# Patient Record
Sex: MALE | Race: ASIAN | NOT HISPANIC OR LATINO | Employment: OTHER | URBAN - METROPOLITAN AREA
[De-identification: names, ages, dates, MRNs, and addresses within clinical notes are randomized per-mention and may not be internally consistent; named-entity substitution may affect disease eponyms.]

---

## 2024-02-02 ENCOUNTER — OFFICE VISIT (OUTPATIENT)
Dept: PODIATRY | Facility: CLINIC | Age: 34
End: 2024-02-02
Payer: COMMERCIAL

## 2024-02-02 VITALS
HEART RATE: 86 BPM | BODY MASS INDEX: 34.21 KG/M2 | SYSTOLIC BLOOD PRESSURE: 121 MMHG | DIASTOLIC BLOOD PRESSURE: 82 MMHG | HEIGHT: 69 IN | WEIGHT: 231 LBS

## 2024-02-02 DIAGNOSIS — M79.674 PAIN DUE TO ONYCHOMYCOSIS OF TOENAILS OF BOTH FEET: Primary | ICD-10-CM

## 2024-02-02 DIAGNOSIS — B35.1 PAIN DUE TO ONYCHOMYCOSIS OF TOENAILS OF BOTH FEET: Primary | ICD-10-CM

## 2024-02-02 DIAGNOSIS — M79.675 PAIN DUE TO ONYCHOMYCOSIS OF TOENAILS OF BOTH FEET: Primary | ICD-10-CM

## 2024-02-02 PROCEDURE — 99203 OFFICE O/P NEW LOW 30 MIN: CPT | Performed by: PODIATRIST

## 2024-02-02 RX ORDER — CICLOPIROX 80 MG/ML
SOLUTION TOPICAL DAILY
Qty: 6.6 ML | Refills: 1 | Status: SHIPPED | OUTPATIENT
Start: 2024-02-02 | End: 2024-02-13

## 2024-02-02 NOTE — PROGRESS NOTES
"PATIENT HISTORY:  Tim Sigala is a 33 year old male who presents to clinic as a self referral with a chief complaint of thick fungal toenails on both feet.  The patient is seen with their wife.  The patient relates the pain is primarily located around the toenails on both feet.  The patient denies any surrounding redness swelling or drainage.  Any previous notes and studies that pertain to the patient's condition were reviewed.    Pertinent medical, surgical and family history was reviewed in the Epic chart.    Past Medical History: History reviewed. No pertinent past medical history.    Medications:   Current Outpatient Medications:     ciclopirox (PENLAC) 8 % external solution, Apply topically daily Apply to affected nails daily.  Remove the residual build up weekly with nail polish remover or an [place] board., Disp: 6.6 mL, Rfl: 1     Allergies:  No Known Allergies    Vitals: /82   Pulse 86   Ht 1.753 m (5' 9\")   Wt 104.8 kg (231 lb)   BMI 34.11 kg/m    BMI= Body mass index is 34.11 kg/m .    LOWER EXTREMITY PHYSICAL EXAM    Dermatologic: Skin is intact to right and left lower extremity without significant lesions, rash or abrasion.   The nails appear to be hypertrophic, incurvated and discolored x 10.     Vascular: DP & PT pulses are intact & regular on the right and left.   CFT and skin temperature is normal to the right and left lower extremity.     Neurologic: Lower extremity sensation is intact to light touch.  No evidence of weakness in the right and left lower extremity.        Musculoskeletal: Patient is ambulatory without assistive device or brace.  No gross ankle deformity noted.  No foot or ankle joint effusion is noted.            ASSESSMENT / PLAN:     ICD-10-CM    1. Pain due to onychomycosis of toenails of both feet  B35.1 ciclopirox (PENLAC) 8 % external solution    M79.675     M79.674           I have explained to Tim about the conditions.  We discussed the underlying contributing " factors to the condition as well as both conservative and surgical treatment options along with expected length of recovery.  At this time, the patient was prescribed Penlac 8% topical antifungal solution to be applied to the affected toenails for 6 to 9 months.  The patient may return to the office for reevaluation of problems persist.    Tim verbalized agreement with and understanding of the rational for the diagnosis and treatment plan.  All questions were answered to best of my ability and the patient's satisfaction. The patient was advised to contact the clinic with any questions that may arise after the clinic visit.      Disclaimer: This note consists of symbols derived from keyboarding, dictation and/or voice recognition software. As a result, there may be errors in the script that have gone undetected. Please consider this when interpreting information found in this chart.       MIKAEL Ewing D.P.M., [email or web address].S.

## 2024-02-02 NOTE — NURSING NOTE
"Chief Complaint   Patient presents with    Consult     Toenail fungus       Initial /82   Pulse 86   Ht 1.753 m (5' 9\")   Wt 104.8 kg (231 lb)   BMI 34.11 kg/m   Estimated body mass index is 34.11 kg/m  as calculated from the following:    Height as of this encounter: 1.753 m (5' 9\").    Weight as of this encounter: 104.8 kg (231 lb).  Medications and allergies reviewed.      Priscila VIRK MA    "

## 2024-02-02 NOTE — LETTER
"    2/2/2024         RE: Tim Sigala  [id number]  [facility] [address]        Dear Colleague,    Thank you for referring your patient, Tim Sigala, to the [facility] [place]. Please see a copy of my visit note below.    PATIENT HISTORY:  Tim Sigala is a 33 year old male who presents to clinic as a self referral with a chief complaint of thick fungal toenails on both feet.  The patient is seen with their wife.  The patient relates the pain is primarily located around the toenails on both feet.  The patient denies any surrounding redness swelling or drainage.  Any previous notes and studies that pertain to the patient's condition were reviewed.    Pertinent medical, surgical and family history was reviewed in the [facility] chart.    Past Medical History: History reviewed. No pertinent past medical history.    Medications:   Current Outpatient Medications:      ciclopirox (PENLAC) 8 % external solution, Apply topically daily Apply to affected nails daily.  Remove the residual build up weekly with nail polish remover or an [place] board., Disp: 6.6 mL, Rfl: 1     Allergies:  No Known Allergies    Vitals: /82   Pulse 86   Ht 1.753 m (5' 9\")   Wt 104.8 kg (231 lb)   BMI 34.11 kg/m    BMI= Body mass index is 34.11 kg/m .    LOWER EXTREMITY PHYSICAL EXAM    Dermatologic: Skin is intact to right and left lower extremity without significant lesions, rash or abrasion.   The nails appear to be hypertrophic, incurvated and discolored x 10.     Vascular: DP & PT pulses are intact & regular on the right and left.   CFT and skin temperature is normal to the right and left lower extremity.     Neurologic: Lower extremity sensation is intact to light touch.  No evidence of weakness in the right and left lower extremity.        Musculoskeletal: Patient is ambulatory without assistive device or brace.  No gross ankle deformity noted.  No foot or ankle joint effusion is noted.        "     ASSESSMENT / PLAN:     ICD-10-CM    1. Pain due to onychomycosis of toenails of both feet  B35.1 ciclopirox (PENLAC) 8 % external solution    M79.675     M79.674           I have explained to Tim about the conditions.  We discussed the underlying contributing factors to the condition as well as both conservative and surgical treatment options along with expected length of recovery.  At this time, the patient was prescribed Penlac 8% topical antifungal solution to be applied to the affected toenails for 6 to 9 months.  The patient may return to the office for reevaluation of problems persist.    Tim verbalized agreement with and understanding of the rational for the diagnosis and treatment plan.  All questions were answered to best of my ability and the patient's satisfaction. The patient was advised to contact the clinic with any questions that may arise after the clinic visit.      Disclaimer: This note consists of symbols derived from keyboarding, dictation and/or voice recognition software. As a result, there may be errors in the script that have gone undetected. Please consider this when interpreting information found in this chart.       MOOSE Orr.P.JEAN., F.A.C.F.A.S.      Again, thank you for allowing me to participate in the care of your patient.        Sincerely,        Galo Ewing DPM

## 2024-02-02 NOTE — PATIENT INSTRUCTIONS
NAIL FUNGUS / ONYCHOMYCOSIS   Nail fungus is not a hygiene problem and will likely not lead to significant medical problems. The nails may get thick causing pain and possibly local skin infection. Treatments include debridement (trimming), oral antifungals, topical antifungals and complete removal of the nail. Most fungal nails are not treated.     Topicals such as tea tree oil can be helpful for surface fungus and may, at best, limit progression. Over the counter creams (such as Lamisil) can also be used however, their effectiveness is also quite low.  Topical treatment with Pen lac is expensive and often not covered by insurance. Pen lac has an approximate 8% success rate. Topical therapy recommendations is to apply twice a day for at least 3-4 months as it takes 9 months for new nail to grow out.     Experts suggest soaking your feet for 15 to 20 minutes in a mixture of 1 cup vinegar to 4 cups warm water. Be sure to rinse well and pat your feet dry when you're done. You can soak your feet like this daily. But if your skin becomes irritated, try soaking only two to three times a week. Vicks VapoRub, as with vinegar, there have been no controlled clinical trials to assess the effectiveness of Vicks VapoRub on nail fungus, but there have been numerous anecdotal reports that it works. There's no consensus on how often to apply this product, so check with your doctor before using it on your nails.      Oral therapies include Sporanox and Lamisil. Oral therapies are also expensive and not very effective. Side effects such as liver disease are the main concern. Return of fungus is common even if the treatment worked.      Other Tips:  - Penlac nail medication apply daily x 4 months; remove old polish first day of each week  - Antifungal cream/powder (Zeasorb) - apply daily to feet and shoes x 2 months  - Clean shoes with Lysol or in washing machine every few weeks  - Rotate shoe gear; give them 24 hours to dry out  between days wearing them  - Clean pair of socks in morning, clean pair in afternoon if your feet sweat  - Shower shoes used in public showers/pools

## 2024-02-12 SDOH — HEALTH STABILITY: PHYSICAL HEALTH: ON AVERAGE, HOW MANY DAYS PER WEEK DO YOU ENGAGE IN MODERATE TO STRENUOUS EXERCISE (LIKE A BRISK WALK)?: 5 DAYS

## 2024-02-12 SDOH — HEALTH STABILITY: PHYSICAL HEALTH: ON AVERAGE, HOW MANY MINUTES DO YOU ENGAGE IN EXERCISE AT THIS LEVEL?: 60 MIN

## 2024-02-12 ASSESSMENT — ASTHMA QUESTIONNAIRES
QUESTION_5 LAST FOUR WEEKS HOW WOULD YOU RATE YOUR ASTHMA CONTROL: COMPLETELY CONTROLLED
QUESTION_4 LAST FOUR WEEKS HOW OFTEN HAVE YOU USED YOUR RESCUE INHALER OR NEBULIZER MEDICATION (SUCH AS ALBUTEROL): NOT AT ALL
ACT_TOTALSCORE: 24
ACT_TOTALSCORE: 24
QUESTION_2 LAST FOUR WEEKS HOW OFTEN HAVE YOU HAD SHORTNESS OF BREATH: ONCE OR TWICE A WEEK
QUESTION_1 LAST FOUR WEEKS HOW MUCH OF THE TIME DID YOUR ASTHMA KEEP YOU FROM GETTING AS MUCH DONE AT WORK, SCHOOL OR AT HOME: NONE OF THE TIME
QUESTION_3 LAST FOUR WEEKS HOW OFTEN DID YOUR ASTHMA SYMPTOMS (WHEEZING, COUGHING, SHORTNESS OF BREATH, CHEST TIGHTNESS OR PAIN) WAKE YOU UP AT NIGHT OR EARLIER THAN USUAL IN THE MORNING: NOT AT ALL

## 2024-02-12 ASSESSMENT — SOCIAL DETERMINANTS OF HEALTH (SDOH): HOW OFTEN DO YOU GET TOGETHER WITH FRIENDS OR RELATIVES?: MORE THAN THREE TIMES A WEEK

## 2024-02-13 ENCOUNTER — OFFICE VISIT (OUTPATIENT)
Dept: FAMILY MEDICINE | Facility: CLINIC | Age: 34
End: 2024-02-13
Payer: COMMERCIAL

## 2024-02-13 VITALS
OXYGEN SATURATION: 99 % | HEIGHT: 69 IN | HEART RATE: 78 BPM | WEIGHT: 238.8 LBS | SYSTOLIC BLOOD PRESSURE: 122 MMHG | BODY MASS INDEX: 35.37 KG/M2 | DIASTOLIC BLOOD PRESSURE: 80 MMHG | TEMPERATURE: 97.5 F | RESPIRATION RATE: 18 BRPM

## 2024-02-13 DIAGNOSIS — J45.20 MILD INTERMITTENT ASTHMA WITHOUT COMPLICATION: ICD-10-CM

## 2024-02-13 DIAGNOSIS — E66.3 OVERWEIGHT: ICD-10-CM

## 2024-02-13 DIAGNOSIS — R53.83 OTHER FATIGUE: ICD-10-CM

## 2024-02-13 DIAGNOSIS — M72.2 PLANTAR FASCIITIS: ICD-10-CM

## 2024-02-13 DIAGNOSIS — Z13.29 SCREENING FOR THYROID DISORDER: ICD-10-CM

## 2024-02-13 DIAGNOSIS — Z13.1 SCREENING FOR DIABETES MELLITUS: ICD-10-CM

## 2024-02-13 DIAGNOSIS — Z00.00 ROUTINE GENERAL MEDICAL EXAMINATION AT A HEALTH CARE FACILITY: Primary | ICD-10-CM

## 2024-02-13 DIAGNOSIS — Z13.220 LIPID SCREENING: ICD-10-CM

## 2024-02-13 LAB
ERYTHROCYTE [DISTWIDTH] IN BLOOD BY AUTOMATED COUNT: 12.4 % (ref 10–15)
HCT VFR BLD AUTO: 46.3 % (ref 40–53)
HGB BLD-MCNC: 15.2 G/DL (ref 13.3–17.7)
MCH RBC QN AUTO: 28.1 PG (ref 26.5–33)
MCHC RBC AUTO-ENTMCNC: 32.8 G/DL (ref 31.5–36.5)
MCV RBC AUTO: 86 FL (ref 78–100)
PLATELET # BLD AUTO: 252 10E3/UL (ref 150–450)
RBC # BLD AUTO: 5.4 10E6/UL (ref 4.4–5.9)
WBC # BLD AUTO: 6.9 10E3/UL (ref 4–11)

## 2024-02-13 PROCEDURE — 99385 PREV VISIT NEW AGE 18-39: CPT | Performed by: FAMILY MEDICINE

## 2024-02-13 PROCEDURE — 99214 OFFICE O/P EST MOD 30 MIN: CPT | Mod: 25 | Performed by: FAMILY MEDICINE

## 2024-02-13 PROCEDURE — 84270 ASSAY OF SEX HORMONE GLOBUL: CPT | Performed by: FAMILY MEDICINE

## 2024-02-13 PROCEDURE — 36415 COLL VENOUS BLD VENIPUNCTURE: CPT | Performed by: FAMILY MEDICINE

## 2024-02-13 PROCEDURE — 80061 LIPID PANEL: CPT | Performed by: FAMILY MEDICINE

## 2024-02-13 PROCEDURE — 85027 COMPLETE CBC AUTOMATED: CPT | Performed by: FAMILY MEDICINE

## 2024-02-13 PROCEDURE — 84443 ASSAY THYROID STIM HORMONE: CPT | Performed by: FAMILY MEDICINE

## 2024-02-13 PROCEDURE — 84403 ASSAY OF TOTAL TESTOSTERONE: CPT | Performed by: FAMILY MEDICINE

## 2024-02-13 PROCEDURE — 83036 HEMOGLOBIN GLYCOSYLATED A1C: CPT | Performed by: FAMILY MEDICINE

## 2024-02-13 PROCEDURE — 80053 COMPREHEN METABOLIC PANEL: CPT | Performed by: FAMILY MEDICINE

## 2024-02-13 RX ORDER — ALBUTEROL SULFATE 90 UG/1
2 AEROSOL, METERED RESPIRATORY (INHALATION) EVERY 6 HOURS PRN
Qty: 18 G | Refills: 3 | Status: SHIPPED | OUTPATIENT
Start: 2024-02-13

## 2024-02-13 ASSESSMENT — PAIN SCALES - GENERAL: PAINLEVEL: NO PAIN (0)

## 2024-02-13 ASSESSMENT — ENCOUNTER SYMPTOMS
CHILLS: 0
UNEXPECTED WEIGHT CHANGE: 0
SORE THROAT: 0
SHORTNESS OF BREATH: 0
DIFFICULTY URINATING: 0
ABDOMINAL PAIN: 0
APNEA: 0
EYE PAIN: 0
CONSTIPATION: 0
WEAKNESS: 0
EYE DISCHARGE: 0
AGITATION: 0
FATIGUE: 0
SINUS PAIN: 0
FEVER: 0
NERVOUS/ANXIOUS: 0
NUMBNESS: 0
PALPITATIONS: 0
NECK STIFFNESS: 0
NAUSEA: 0
SINUS PRESSURE: 0
EYE ITCHING: 0
COLOR CHANGE: 0
DYSURIA: 0
VOMITING: 0
FREQUENCY: 0
WHEEZING: 0
ACTIVITY CHANGE: 0
NECK PAIN: 0
BACK PAIN: 0
CONFUSION: 0
COUGH: 0
HEADACHES: 0
DIZZINESS: 0
PHOTOPHOBIA: 0
APPETITE CHANGE: 0
SLEEP DISTURBANCE: 0
DECREASED CONCENTRATION: 0
DIARRHEA: 0

## 2024-02-13 NOTE — PROGRESS NOTES
Preventive Care Visit  [facility] CHERI Baeza DO, Family Medicine  Feb 13, 2024      1. Routine general medical examination at a health care facility    2. Lipid screening  - Lipid panel reflex to direct LDL Fasting; Future  - Lipid panel reflex to direct LDL Fasting    3. Screening for diabetes mellitus  - Comprehensive metabolic panel (BMP + Alb, Alk Phos, ALT, AST, Total. Bili, TP); Future  - Comprehensive metabolic panel (BMP + Alb, Alk Phos, ALT, AST, Total. Bili, TP)    4. Mild intermittent asthma without complication  - albuterol (PROAIR HFA/PROVENTIL HFA/VENTOLIN HFA) 108 (90 Base) MCG/ACT inhaler; Inhale 2 puffs into the lungs every 6 hours as needed for shortness of breath, wheezing or cough  Dispense: 18 g; Refill: 3    5. Screening for thyroid disorder  - TSH with free T4 reflex; Future  - TSH with free T4 reflex    6. Other fatigue  - Testosterone Free and Total; Future  - CBC with platelets; Future  - Comprehensive metabolic panel (BMP + Alb, Alk Phos, ALT, AST, Total. Bili, TP); Future  - Testosterone Free and Total  - CBC with platelets  - Comprehensive metabolic panel (BMP + Alb, Alk Phos, ALT, AST, Total. Bili, TP)  - PRIMARY CARE FOLLOW-UP SCHEDULING; Future    7. Overweight  Stressed the importance of diet and exercise.  Close follow-up in 1 month.    8. Plantar fasciitis  Bilateral.  Encourage stretch exercises.       I spent 30 minutes with patient of which 50% was spent counseling and coordinating patient's care as well as discussing patient's plan of care.      Amparo Dumont is a 33 year old, presenting for the following:  Physical        2/13/2024     9:35 AM   Additional Questions   Roomed by Deidre   Accompanied by Sherley- wife        Health Care Directive  Patient does not have a Health Care Directive or Living Will: Discussed advance care planning with patient; however, patient declined at this time.    HPI  Patient is having acid reflux, and heel pain,  also is concerned about weight gain.         2/12/2024   General Health   How would you rate your overall physical health? (!) FAIR   Feel stress (tense, anxious, or unable to sleep) Only a little   (!) STRESS CONCERN      2/12/2024   Nutrition   Three or more servings of calcium each day? (!) NO   Diet: Regular (no restrictions)   How many servings of fruit and vegetables per day? (!) 2-3   How many sweetened beverages each day? 0-1         2/12/2024   Exercise   Days per week of moderate/strenous exercise 5 days   Average minutes spent exercising at this level 60 min         2/12/2024   Social Factors   Frequency of gathering with friends or relatives More than three times a week   Worry food won't last until get money to buy more No   Food not last or not have enough money for food? No   Do you have housing?  Yes   Are you worried about losing your housing? No   Lack of transportation? No   Unable to get utilities (heat,electricity)? No         2/12/2024   Dental   Dentist two times every year? Yes         2/12/2024   TB Screening   Were you born outside of [place]?  No           Today's PHQ-2 Score:       2/2/2024     8:07 AM   PHQ-2 ( 1999 [employer])   Q1: Little interest or pleasure in doing things 0   Q2: Feeling down, depressed or hopeless 0   PHQ-2 Score 0         2/12/2024   Substance Use   Alcohol more than 3/day or more than 7/wk Not Applicable   Do you use any other substances recreationally? No     Social History     Tobacco Use    Smoking status: Never     Passive exposure: Never    Smokeless tobacco: Never   Vaping Use    Vaping Use: Never used   Substance Use Topics    Alcohol use: Not Currently    Drug use: Never             2/12/2024   One time HIV Screening   Previous HIV test? I don't know         2/12/2024   STI Screening   New sexual partner(s) since last STI/HIV test? (!) YES          2/12/2024   Contraception/Family Planning   Questions about contraception or family planning No        Reviewed and  updated as needed this visit by Provider                    Past Medical History:   Diagnosis Date    Uncomplicated asthma 1994     Past Surgical History:   Procedure Laterality Date    HC REMOVAL OF TONSILS,<11 Y/O      Description: Tonsillectomy;  Recorded: 02/12/2013;     Establish care and physical exam    2. Weight concerns: 7-8 years ago, 247.  With regular exercise and diet, patient was able 180 lbs.  Had a  and worked out.  In the past 2 years, weight has slowly crept up.  Over the past 6 months, gained weight. Has been working out and barely ate.  Nothing seems to work.  Last two months, eats oatmeal.     3. GERD: Gets worse with spicy foods and eating.  Limiting eating.  Took omeprazole with good relief for the past 3 months.  Not able to eat well.    4. Bilateral heel discomfort: Gets better as day progresses.     Review of Systems   Constitutional:  Negative for activity change, appetite change, chills, fatigue, fever and unexpected weight change.   HENT:  Negative for congestion, ear pain, sinus pressure, sinus pain and sore throat.    Eyes:  Negative for photophobia, pain, discharge, itching and visual disturbance.   Respiratory:  Negative for apnea, cough, shortness of breath and wheezing.    Cardiovascular:  Negative for chest pain, palpitations and leg swelling.   Gastrointestinal:  Negative for abdominal pain, constipation, diarrhea, nausea and vomiting.   Genitourinary:  Negative for difficulty urinating, dysuria, frequency and urgency.   Musculoskeletal:  Negative for back pain, neck pain and neck stiffness.   Skin:  Negative for color change and rash.   Neurological:  Negative for dizziness, syncope, weakness, numbness and headaches.   Psychiatric/Behavioral:  Negative for agitation, behavioral problems, confusion, decreased concentration and sleep disturbance. The patient is not nervous/anxious.           Objective    Exam  /80   Pulse 78   Temp 97.5  F (36.4  C) (Temporal)    "Resp 18   Ht 1.746 m (5' 8.74\")   Wt 108.3 kg (238 lb 12.8 oz)   SpO2 99%   BMI 35.53 kg/m     Estimated body mass index is 35.53 kg/m  as calculated from the following:    Height as of this encounter: 1.746 m (5' 8.74\").    Weight as of this encounter: 108.3 kg (238 lb 12.8 oz).    Physical Exam  Constitutional:       General: He is not in acute distress.  HENT:      Head: Normocephalic and atraumatic.      Right Ear: External ear normal.      Left Ear: External ear normal.      Nose: Nose normal.      Mouth/Throat:      Pharynx: No oropharyngeal exudate.   Eyes:      General:         Right eye: No discharge.         Left eye: No discharge.      Conjunctiva/sclera: Conjunctivae normal.      Pupils: Pupils are equal, round, and reactive to light.   Neck:      Thyroid: No thyromegaly.      Trachea: No tracheal deviation.   Cardiovascular:      Rate and Rhythm: Normal rate and regular rhythm.      Heart sounds: Normal heart sounds. No murmur heard.  Pulmonary:      Effort: No respiratory distress.      Breath sounds: Normal breath sounds. No wheezing.   Chest:      Chest wall: No tenderness.   Abdominal:      General: There is no distension.      Palpations: Abdomen is soft. There is no mass.      Tenderness: There is no abdominal tenderness. There is no guarding.   Musculoskeletal:         General: Normal range of motion.      Cervical back: Normal range of motion and neck supple.      Comments: Bilateral heal tenderness   Lymphadenopathy:      Cervical: No cervical adenopathy.   Skin:     General: Skin is warm.      Findings: No erythema or rash.   Neurological:      Mental Status: He is alert and oriented to person, place, and time.      Cranial Nerves: No cranial nerve deficit.      Coordination: Coordination normal.         Signed Electronically by: Wilfrido Baeza DO    "

## 2024-02-14 DIAGNOSIS — E78.5 HYPERLIPIDEMIA LDL GOAL <160: Primary | ICD-10-CM

## 2024-02-14 LAB
ALBUMIN SERPL BCG-MCNC: 4.4 G/DL (ref 3.5–5.2)
ALP SERPL-CCNC: 59 U/L (ref 40–150)
ALT SERPL W P-5'-P-CCNC: 52 U/L (ref 0–70)
ANION GAP SERPL CALCULATED.3IONS-SCNC: 11 MMOL/L (ref 7–15)
AST SERPL W P-5'-P-CCNC: 30 U/L (ref 0–45)
BILIRUB SERPL-MCNC: 0.3 MG/DL
BUN SERPL-MCNC: 13.7 MG/DL (ref 6–20)
CALCIUM SERPL-MCNC: 9.5 MG/DL (ref 8.6–10)
CHLORIDE SERPL-SCNC: 105 MMOL/L (ref 98–107)
CHOLEST SERPL-MCNC: 245 MG/DL
CREAT SERPL-MCNC: 0.97 MG/DL (ref 0.67–1.17)
DEPRECATED HCO3 PLAS-SCNC: 25 MMOL/L (ref 22–29)
EGFRCR SERPLBLD CKD-EPI 2021: >90 ML/MIN/1.73M2
FASTING STATUS PATIENT QL REPORTED: ABNORMAL
GLUCOSE SERPL-MCNC: 95 MG/DL (ref 70–99)
HDLC SERPL-MCNC: 34 MG/DL
LDLC SERPL CALC-MCNC: 191 MG/DL
NONHDLC SERPL-MCNC: 211 MG/DL
POTASSIUM SERPL-SCNC: 4.7 MMOL/L (ref 3.4–5.3)
PROT SERPL-MCNC: 7.6 G/DL (ref 6.4–8.3)
SHBG SERPL-SCNC: 23 NMOL/L (ref 11–80)
SODIUM SERPL-SCNC: 141 MMOL/L (ref 135–145)
TRIGL SERPL-MCNC: 102 MG/DL
TSH SERPL DL<=0.005 MIU/L-ACNC: 2.63 UIU/ML (ref 0.3–4.2)

## 2024-02-14 RX ORDER — SIMVASTATIN 40 MG
40 TABLET ORAL AT BEDTIME
Qty: 90 TABLET | Refills: 1 | Status: SHIPPED | OUTPATIENT
Start: 2024-02-14 | End: 2024-07-16

## 2024-02-15 DIAGNOSIS — Z13.1 SCREENING FOR DIABETES MELLITUS: Primary | ICD-10-CM

## 2024-02-15 LAB
HBA1C MFR BLD: 5.6 % (ref 0–5.6)
TESTOST FREE SERPL-MCNC: 6.44 NG/DL
TESTOST SERPL-MCNC: 272 NG/DL (ref 240–950)

## 2024-03-21 ENCOUNTER — OFFICE VISIT (OUTPATIENT)
Dept: FAMILY MEDICINE | Facility: CLINIC | Age: 34
End: 2024-03-21
Attending: FAMILY MEDICINE
Payer: COMMERCIAL

## 2024-03-21 ENCOUNTER — TELEPHONE (OUTPATIENT)
Dept: FAMILY MEDICINE | Facility: CLINIC | Age: 34
End: 2024-03-21

## 2024-03-21 VITALS
TEMPERATURE: 97 F | DIASTOLIC BLOOD PRESSURE: 70 MMHG | WEIGHT: 236.8 LBS | RESPIRATION RATE: 18 BRPM | HEART RATE: 86 BPM | HEIGHT: 69 IN | OXYGEN SATURATION: 97 % | BODY MASS INDEX: 35.07 KG/M2 | SYSTOLIC BLOOD PRESSURE: 120 MMHG

## 2024-03-21 DIAGNOSIS — K21.00 GASTROESOPHAGEAL REFLUX DISEASE WITH ESOPHAGITIS WITHOUT HEMORRHAGE: ICD-10-CM

## 2024-03-21 DIAGNOSIS — E66.01 MORBID OBESITY (H): Primary | ICD-10-CM

## 2024-03-21 DIAGNOSIS — E78.5 HYPERLIPIDEMIA LDL GOAL <160: ICD-10-CM

## 2024-03-21 PROCEDURE — 99214 OFFICE O/P EST MOD 30 MIN: CPT | Performed by: FAMILY MEDICINE

## 2024-03-21 RX ORDER — OMEPRAZOLE 40 MG/1
40 CAPSULE, DELAYED RELEASE ORAL DAILY
Qty: 90 CAPSULE | Refills: 1 | Status: SHIPPED | OUTPATIENT
Start: 2024-03-21 | End: 2024-09-08

## 2024-03-21 ASSESSMENT — ENCOUNTER SYMPTOMS
NERVOUS/ANXIOUS: 0
WHEEZING: 0
COLOR CHANGE: 0
APPETITE CHANGE: 0
ALLERGIC/IMMUNOLOGIC NEGATIVE: 1
HALLUCINATIONS: 0
GASTROINTESTINAL NEGATIVE: 1
HEMATOLOGIC/LYMPHATIC NEGATIVE: 1
ACTIVITY CHANGE: 0
FEVER: 0
DIZZINESS: 0
DECREASED CONCENTRATION: 0
EYE PAIN: 0
ENDOCRINE NEGATIVE: 1
EYE DISCHARGE: 0
SEIZURES: 0
AGITATION: 0
COUGH: 0
FATIGUE: 0
HEADACHES: 0
CONFUSION: 0
SHORTNESS OF BREATH: 0
PALPITATIONS: 0

## 2024-03-21 ASSESSMENT — PAIN SCALES - GENERAL: PAINLEVEL: NO PAIN (0)

## 2024-03-21 NOTE — TELEPHONE ENCOUNTER
Prior Authorization Retail Medication Request    Medication/Dose: Semaglutide-Weight Management (WEGOVY) 0.25 MG/0.5ML pen  Diagnosis and ICD code (if different than what is on RX):  E66.01   New/renewal/insurance change PA/secondary ins. PA:  Previously Tried and Failed:  na  Rationale:  na    Insurance   Primary:  MEDICA  Insurance ID:  [id number]     Secondary (if applicable):na  Insurance ID:  na    Pharmacy Information (if different than what is on RX)  [facility]  Phone:  [phone]  Fax:     [phone]

## 2024-03-21 NOTE — PROGRESS NOTES
1. Morbid obesity (H)  Stressed the importance of diet and exercise.  Advised this is most likely attributing to patient's hyperlipidemia.   - Semaglutide-Weight Management (WEGOVY) 0.25 MG/0.5ML pen; Inject 0.25 mg Subcutaneous once a week  Dispense: 2 mL; Refill: 0    2. Gastroesophageal reflux disease with esophagitis without hemorrhage  Please avoid foods or drinks which contain citrus (tomato, pineapple, lime, lemon, etc), caffeine, chocolate, and spicy foods.  Avoid eating late at night.   - omeprazole (PRILOSEC) 40 MG DR capsule; Take 1 capsule (40 mg) by mouth daily  Dispense: 90 capsule; Refill: 1    3. Hyperlipidemia LDL goal <160  Advised to continue with simvastatin.     I spent 30 minutes with patient of which 50% was spent counseling and coordinating patient's care as well as discussing patient's plan of care.      Amparo Dumont is a 34 year old, presenting for the following health issues:  Hyperlipidemia, Weight Problem, and Gastrophageal Reflux      3/21/2024     9:01 AM   Additional Questions   Roomed by Deidre   Accompanied by Wife- Sherley     History of Present Illness       Reason for visit:  Follow up from last appt    He eats 2-3 servings of fruits and vegetables daily.He consumes 0 sweetened beverage(s) daily.He exercises with enough effort to increase his heart rate 30 to 60 minutes per day.  He exercises with enough effort to increase his heart rate 6 days per week.   He is taking medications regularly.         Hyperlipidemia Follow-Up    Are you regularly taking any medication or supplement to lower your cholesterol?   Yes- simvastatin   Are you having muscle aches or other side effects that you think could be caused by your cholesterol lowering medication?  No    Patient cami pt follow up on weight, and GERD is still having GERD symptoms.   How many servings of fruits and vegetables do you eat daily?  2-3  On average, how many sweetened beverages do you drink each day (Examples:  "soda, juice, sweet tea, etc.  Do NOT count diet or artificially sweetened beverages)?   0  How many days per week do you exercise enough to make your heart beat faster? 6  How many minutes a day do you exercise enough to make your heart beat faster? 30 - 60  How many days per week do you miss taking your medication? 0      Weight follow-up: Last two pounds since the last visit.  Patient has been diet and actually fasting with no marked improvement.  He also has been exercising as well.     2. Hyperlipidemia: Currently on simavastin.  Tolerating medication.    3. GERD: Chronic condition.  Tried omeprazole and diet modification.  States of heartburn symptoms at night time.     Review of Systems   Constitutional:  Negative for activity change, appetite change, fatigue and fever.   HENT: Negative.     Eyes:  Negative for pain, discharge and visual disturbance.   Respiratory:  Negative for cough, shortness of breath and wheezing.    Cardiovascular:  Negative for chest pain, palpitations and leg swelling.   Gastrointestinal: Negative.    Endocrine: Negative.    Genitourinary: Negative.    Skin:  Negative for color change and rash.   Allergic/Immunologic: Negative.    Neurological:  Negative for dizziness, seizures and headaches.   Hematological: Negative.    Psychiatric/Behavioral:  Negative for agitation, confusion, decreased concentration and hallucinations. The patient is not nervous/anxious.            Objective    /70   Pulse 86   Temp 97  F (36.1  C) (Temporal)   Resp 18   Ht 1.746 m (5' 8.74\")   Wt 107.4 kg (236 lb 12.8 oz)   SpO2 97%   BMI 35.23 kg/m    Body mass index is 35.23 kg/m .  Physical Exam  Constitutional:       General: He is not in acute distress.  HENT:      Head: Normocephalic and atraumatic.   Eyes:      Conjunctiva/sclera: Conjunctivae normal.   Cardiovascular:      Rate and Rhythm: Normal rate and regular rhythm.      Heart sounds: Normal heart sounds. No murmur heard.  Pulmonary:     "  Effort: Pulmonary effort is normal. No respiratory distress.      Breath sounds: No wheezing or rales.   Musculoskeletal:         General: Normal range of motion.   Skin:     Findings: No rash.   Neurological:      Mental Status: He is alert and oriented to person, place, and time.          Signed Electronically by: Wilfrido Baeza DO

## 2024-03-21 NOTE — PATIENT INSTRUCTIONS
Trae Dumont,    Thank you for allowing [facility] to manage your care.    I sent your prescriptions to your pharmacy.    For you weight, I am starting you on Wegovy 0.25 mg once a week.    Encourage 9071-7687 mg of calcium with vitamin D 1000 international units daily.     If you have any questions or concerns, please feel free to call us at [phone].    Sincerely,    Dr. Baeza    Did you know?      You can schedule a video visit for follow-up appointments as well as future appointments for certain conditions.  Please see the below link.     [email or web address]    If you have not already done so,  I encourage you to sign up for [facility] ([email or web address]).  This will allow you to review your results, securely communicate with a provider, and schedule virtual visits as well.

## 2024-03-22 ENCOUNTER — TELEPHONE (OUTPATIENT)
Dept: FAMILY MEDICINE | Facility: CLINIC | Age: 34
End: 2024-03-22

## 2024-03-22 NOTE — TELEPHONE ENCOUNTER
Prior Authorization Retail Medication Request    Medication/Dose: omeprazole (PRILOSEC) 40 MG DR capsule  Diagnosis and ICD code (if different than what is on RX):  K21.00   New/renewal/insurance change PA/secondary ins. PA:  Previously Tried and Failed:  na  Rationale:  na    Insurance   Primary:  MEDICA  Insurance ID:  [id number]     Secondary (if applicable):eliezer  Insurance ID:  eliezer    Pharmacy Information (if different than what is on RX)  Name:   [facility]  Phone:   [phone]  Fax:   [phone]

## 2024-03-26 NOTE — TELEPHONE ENCOUNTER
Pt transferred script to [facility] in [place] on [address]. PA still needed.     Jeanne Plascencia RN on 3/26/2024 at 1:45 PM

## 2024-03-26 NOTE — TELEPHONE ENCOUNTER
Pt transferred script to [facility] in [place] on [address]. PA still needed.     Jeanne Plascencia RN on 3/26/2024 at 1:45 PM

## 2024-04-03 NOTE — TELEPHONE ENCOUNTER
PRIOR AUTHORIZATION DENIED    Medication: WEGOVY 0.25 MG/0.5ML SC SOAJ    Insurance Company: [employer]-Specialty PA's - Phone [phone] Fax [phone]    Denial Date: 4/2/2024    Denial Reason(s): Excluded

## 2024-04-03 NOTE — TELEPHONE ENCOUNTER
PRIOR AUTHORIZATION DENIED    Medication: OMEPRAZOLE 40 MG PO CPDR    Insurance Company: [employer]-Specialty PA's - Phone [phone] Fax [phone]    Denial Date: 4/2/2024    Denial Reason(s): Excluded

## 2024-05-07 ENCOUNTER — OFFICE VISIT (OUTPATIENT)
Dept: FAMILY MEDICINE | Facility: CLINIC | Age: 34
End: 2024-05-07
Attending: FAMILY MEDICINE
Payer: COMMERCIAL

## 2024-05-07 VITALS
WEIGHT: 244 LBS | SYSTOLIC BLOOD PRESSURE: 118 MMHG | RESPIRATION RATE: 18 BRPM | TEMPERATURE: 97.2 F | HEART RATE: 72 BPM | OXYGEN SATURATION: 100 % | HEIGHT: 70 IN | BODY MASS INDEX: 34.93 KG/M2 | DIASTOLIC BLOOD PRESSURE: 68 MMHG

## 2024-05-07 DIAGNOSIS — E66.812 CLASS 2 SEVERE OBESITY WITH SERIOUS COMORBIDITY AND BODY MASS INDEX (BMI) OF 35.0 TO 35.9 IN ADULT, UNSPECIFIED OBESITY TYPE (H): Primary | ICD-10-CM

## 2024-05-07 DIAGNOSIS — E66.01 CLASS 2 SEVERE OBESITY WITH SERIOUS COMORBIDITY AND BODY MASS INDEX (BMI) OF 35.0 TO 35.9 IN ADULT, UNSPECIFIED OBESITY TYPE (H): Primary | ICD-10-CM

## 2024-05-07 PROCEDURE — 99213 OFFICE O/P EST LOW 20 MIN: CPT | Performed by: FAMILY MEDICINE

## 2024-05-07 ASSESSMENT — ENCOUNTER SYMPTOMS
AGITATION: 0
HALLUCINATIONS: 0
FATIGUE: 0
SEIZURES: 0
EYE PAIN: 0
HEMATOLOGIC/LYMPHATIC NEGATIVE: 1
SHORTNESS OF BREATH: 0
COLOR CHANGE: 0
COUGH: 0
WHEEZING: 0
ALLERGIC/IMMUNOLOGIC NEGATIVE: 1
PALPITATIONS: 0
ACTIVITY CHANGE: 0
HEADACHES: 0
DECREASED CONCENTRATION: 0
FEVER: 0
CONFUSION: 0
APPETITE CHANGE: 0
ENDOCRINE NEGATIVE: 1
NERVOUS/ANXIOUS: 0
EYE DISCHARGE: 0
DIZZINESS: 0
GASTROINTESTINAL NEGATIVE: 1

## 2024-05-07 ASSESSMENT — PAIN SCALES - GENERAL: PAINLEVEL: NO PAIN (0)

## 2024-05-07 NOTE — PROGRESS NOTES
1. Class 2 severe obesity with serious comorbidity and body mass index (BMI) of 35.0 to 35.9 in adult, unspecified obesity type (H)  Stressed the importance of diet and exercise.  If Zepbound is not covered, consider phentermine.  Advised patient to not get discouraged.  Close follow-up in 6-8 weeks.   - tirzepatide-Weight Management (ZEPBOUND) 2.5 MG/0.5ML prefilled pen; Inject 0.5 mLs (2.5 mg) Subcutaneous every 7 days  Dispense: 3 mL; Refill: 0  - Adult Comprehensive Weight Management  Referral; Future      Subjective   Tim is a 34 year old, presenting for the following health issues:  Weight Check      5/7/2024     9:58 AM   Additional Questions   Roomed by Deidre   Accompanied by Wife Sherley     History of Present Illness       Reason for visit:  Weight management    He eats 2-3 servings of fruits and vegetables daily.He consumes 0 sweetened beverage(s) daily.He exercises with enough effort to increase his heart rate 20 to 29 minutes per day.  He exercises with enough effort to increase his heart rate 4 days per week.   He is taking medications regularly.         Patient is not feeling good about his weight loss today he is up 8 LBS from last visit in March   How many servings of fruits and vegetables do you eat daily?  2-3  On average, how many sweetened beverages do you drink each day (Examples: soda, juice, sweet tea, etc.  Do NOT count diet or artificially sweetened beverages)?   0  How many days per week do you exercise enough to make your heart beat faster? 4  How many minutes a day do you exercise enough to make your heart beat faster? 20 - 29  How many days per week do you miss taking your medication? 0      Overweight follow-up: Patient continues to exercise and diet.  Wegovy was not covered.  Unfortunately, he has gained weight since the last visit.  He states that he is trying to watch his diet by eating healthy and portion sizes.  He and wife are active in exercising.  This has been a  "chronic condition that is negatively impacting his mental health.     2. GERD: States that omeprazole.  He is trying to watch his diet which helping his symptoms.       Review of Systems   Constitutional:  Negative for activity change, appetite change, fatigue and fever.   HENT: Negative.     Eyes:  Negative for pain, discharge and visual disturbance.   Respiratory:  Negative for cough, shortness of breath and wheezing.    Cardiovascular:  Negative for chest pain, palpitations and leg swelling.   Gastrointestinal: Negative.    Endocrine: Negative.    Genitourinary: Negative.    Skin:  Negative for color change and rash.   Allergic/Immunologic: Negative.    Neurological:  Negative for dizziness, seizures and headaches.   Hematological: Negative.    Psychiatric/Behavioral:  Negative for agitation, confusion, decreased concentration and hallucinations. The patient is not nervous/anxious.            Objective    /68   Pulse 72   Temp 97.2  F (36.2  C) (Temporal)   Resp 18   Ht 1.766 m (5' 9.53\")   Wt 110.7 kg (244 lb)   SpO2 100%   BMI 35.49 kg/m    Body mass index is 35.49 kg/m .  Physical Exam  Constitutional:       General: He is not in acute distress.     Appearance: He is well-developed.   HENT:      Head: Normocephalic and atraumatic.      Nose: Nose normal.   Eyes:      Conjunctiva/sclera: Conjunctivae normal.   Neck:      Trachea: No tracheal deviation.   Cardiovascular:      Rate and Rhythm: Normal rate and regular rhythm.      Heart sounds: Normal heart sounds.   Pulmonary:      Effort: Pulmonary effort is normal.      Breath sounds: No wheezing.   Musculoskeletal:         General: Normal range of motion.      Cervical back: Normal range of motion.   Skin:     Findings: No erythema or rash.   Neurological:      Mental Status: He is alert and oriented to person, place, and time.   Psychiatric:         Behavior: Behavior normal.                Signed Electronically by: Wilfrido Baeza DO    "

## 2024-05-07 NOTE — PATIENT INSTRUCTIONS
Trae Dumont,    Thank you for allowing [facility] to manage your care.    I sent your prescriptions to your pharmacy.    If you have any questions or concerns, please feel free to call us at [phone].    Sincerely,    Dr. Baeza    Did you know?      You can schedule a video visit for follow-up appointments as well as future appointments for certain conditions.  Please see the below link.     [email or web address]    If you have not already done so,  I encourage you to sign up for [employer] ([email or web address]).  This will allow you to review your results, securely communicate with a provider, and schedule virtual visits as well.

## 2024-05-08 ENCOUNTER — TELEPHONE (OUTPATIENT)
Dept: FAMILY MEDICINE | Facility: CLINIC | Age: 34
End: 2024-05-08

## 2024-05-08 NOTE — TELEPHONE ENCOUNTER
Prior Authorization Retail Medication Request    Medication/Dose: tirzepatide-Weight Management (ZEPBOUND) 2.5 MG/0.5ML prefilled pen  Diagnosis and ICD code (if different than what is on RX):    New/renewal/insurance change PA/secondary ins. PA:  Previously Tried and Failed:  na  Rationale:  na    Insurance   Primary: MEDICA  Insurance ID:  [id number]     Secondary (if applicable):na  Insurance ID:  na    Pharmacy Information (if different than what is on RX)  Name:  [facility]  Phone:  [phone]  Fax:      [phone]

## 2024-05-22 DIAGNOSIS — E78.5 HYPERLIPIDEMIA LDL GOAL <160: ICD-10-CM

## 2024-05-22 RX ORDER — SIMVASTATIN 40 MG
40 TABLET ORAL AT BEDTIME
Qty: 90 TABLET | Refills: 1 | OUTPATIENT
Start: 2024-05-22

## 2024-05-22 NOTE — TELEPHONE ENCOUNTER
Note: Due to record-high volumes, our turn-around time is taking longer than usual . We are currently 2 weeks behind in the pools.   We are working diligently to submit all requests in a timely manner and in the order they are received. Please only flag TRUE URGENT requests as high priority to the pool at this time.   If you have questions on status of PA's,  please send a note/message in the active PA encounter and send back to the [facility] PA pool [[id number]].    If you have questions about the turn-around time or about our process, please reach out to our supervisor Gladis Reyes.   Thank you!   RPPA (Retail Pharmacy Prior Authorization) team    We are currently submitting requests we received on 05/08/2024    Retail Pharmacy Prior Authorization Team   Phone: [phone]      PA Initiation    Medication: ZEPBOUND 2.5 MG/0.5ML SC SOAJ  Insurance Company: [employer] ([employer]) - Phone [phone] Fax [phone]  Pharmacy Filling the Rx: [facility] [address] [address]  Filling Pharmacy Phone: [phone]  Filling Pharmacy Fax:    Start Date: 5/22/2024    Started PA on CMM and a response of This medication may be excluded from the patient's benefit. For more information, please reach out to [employer] directly.  Per insurance, this medication is a plan exclusion and there is not an option to complete a PA.

## 2024-07-16 ENCOUNTER — TELEPHONE (OUTPATIENT)
Dept: FAMILY MEDICINE | Facility: CLINIC | Age: 34
End: 2024-07-16
Payer: COMMERCIAL

## 2024-07-16 ENCOUNTER — PATIENT OUTREACH (OUTPATIENT)
Dept: CARE COORDINATION | Facility: CLINIC | Age: 34
End: 2024-07-16
Payer: COMMERCIAL

## 2024-07-16 RX ORDER — PRASUGREL 10 MG/1
TABLET, FILM COATED ORAL DAILY
COMMUNITY

## 2024-07-16 RX ORDER — ATORVASTATIN CALCIUM 80 MG/1
80 TABLET, FILM COATED ORAL DAILY
COMMUNITY

## 2024-07-16 RX ORDER — NITROGLYCERIN 0.4 MG/1
0.4 TABLET SUBLINGUAL EVERY 5 MIN PRN
COMMUNITY

## 2024-07-16 RX ORDER — LOSARTAN POTASSIUM 25 MG/1
25 TABLET ORAL DAILY
COMMUNITY

## 2024-07-16 RX ORDER — METOPROLOL SUCCINATE 25 MG/1
25 TABLET, EXTENDED RELEASE ORAL DAILY
COMMUNITY

## 2024-07-16 RX ORDER — ASPIRIN 81 MG/1
81 TABLET, CHEWABLE ORAL DAILY
COMMUNITY

## 2024-07-16 NOTE — TELEPHONE ENCOUNTER
Attempted to call patient with number on file to relay provider's message below with no answer, left voicemail to call clinic back at [phone].     Jeanne Plascencia, RN on 7/16/2024 at 4:34 PM

## 2024-07-16 NOTE — PROGRESS NOTES
Clinical Product Navigator RN reviewed chart; patient on payer product coverage.  Review results:        Obtaining further information to determine needs and next steps. Patient identified by Health Plan as a potential candidate for Primary Care Coordination due to recent admission. Per chart review, Patient was admitted to [facility] 7/12 - 7/13/24 for CAD / NSTEMI.    S/p PCI dLAD and pD1 7/12/2024 , plan for staged intervention of the RCA, Marginal and LAD in 2 weeks     Ischemic cardiomyopathy ,ejection fraction 45-50% with inferior wall motion abnormalities.     RN Clinical Product Navigator called and spoke with Velasquezalfredosabiha. Tim states that he is feeling better every day. Denies any new concerns or complaints. Confirms that he has all of his medications and has been taking them as prescribed. Home Medication list updated.     Per AVS:     STOP taking these medicines       simvastatin 40 mg tablet  Commonly known as: ZOCOR        Started on these new Medications:      Instructions   aspirin chewable 81 mg chewable tablet  For diagnoses: Coronary artery disease involving native coronary artery of native heart, unspecified whether angina present  Start taking on: July 14, 2024    Chew 1 Tablet (81 mg) by mouth once daily.      atorvastatin 80 mg tablet  For diagnoses: Coronary artery disease involving native coronary artery of native heart, unspecified whether angina present  Commonly known as: LIPITOR    Take 1 Tablet (80 mg) by mouth once daily in the evening.      losartan 25 mg tablet  For diagnoses: NSTEMI (non-ST elevated myocardial infarction) (HC)  Commonly known as: COZAAR    Take 0.5 Tablets (12.5 mg) by mouth once daily.      metoprolol succinate 25 mg Sustained-Release tablet  For diagnoses: Coronary artery disease involving native coronary artery of native heart, unspecified whether angina present  Commonly known as: TOPROL XL    Take 1 Tablet (25 mg) by mouth two times daily.      nitroglycerin  0.4 mg sublingual tablet  For diagnoses: NSTEMI (non-ST elevated myocardial infarction) (HC)  Commonly known as: NITROSTAT    Place 1 Tablet (0.4 mg) under the tongue every 5 minutes if needed for Chest pain 1st choice (Hold if SBP less than 90 mmHg). Up to 3 tablets in 15 minutes.      prasugreL 10 mg Tab tablet  For diagnoses: NSTEMI (non-ST elevated myocardial infarction) (HC), Coronary artery disease involving native coronary artery of native heart, unspecified whether angina present  Commonly known as: EFFIENT      Patient states that he is awaiting prior authorization approval for Echo before he is able to schedule. Confirms he has the below Cardiology follow up appointments.     [facility]   7/24/24 at 1pm. Cardiology follow up with Dr. Licea. Cardiology RN plans to call patient weekly for updates and close monitoring.     Patient was previously scheduled for follow up with PCP regarding weight management for 8/6/24 at 1pm. Inquired if patient would like CPN to secure an earlier appointment for post hospital follow up with PCP. Patient declines offer and states that he feels well supported with Cardiology follow up next week.     Patient declines on going Primary Care Coordination needs at this time.    RN Clinical Product Navigator will send FYI to PCP.      Tanika Gardner RN  Clinical Product Navigator  Ph: [phone]

## 2024-07-16 NOTE — PROGRESS NOTES
Clinic Care Coordination Contact  Transitions of Care Outreach  Chief Complaint   Patient presents with    Clinic Care Coordination - Post Hospital       Patient was admitted to [facility] 7/12 - 7/13/24 for CAD / NSTEMI.              S/p PCI dLAD and pD1 7/12/2024 , plan for staged intervention of the RCA, Marginal and LAD in 2 weeks    Ischemic cardiomyopathy ,ejection fraction 45-50% with inferior wall motion abnormalities.     Hospital Course:     Tim Sigala is a(n) 34 y.o. with a history of obesity, hyperlipidemia, GERD, mild intermittent asthma, who was in his usual state of health up until 9:30 PM last night when he started having pain in the anterior chest that radiated up to neck and jaw.  Pain was dull 5/10 intensity at its highest.  EMS was called who gave 4 baby aspirin and sublingual nitroglycerin.  His chest pain improved following that but he had persistent jaw and neck pain.  He had some subjective shortness of breath with chest pain.  No fever, chills or cough.  No syncope.  No history of alcohol or tobacco use.  Both parents have hyperlipidemia.  History of heart disease in grandfather.      Transitions of Care Assessment    Discharge Assessment  How are you doing now that you are home?: RN Clinical Product Navigator called and spoke with Tim. Tim states that he is feeling better every day. Denies any new concerns or complaints. Confirms that he has all of his medications and has been taking them as prescribed. Home Medication list updated. Patient states that he is awaiting prior authorization approval for Echo before he is able to schedule. Confirms he has the below Cardiology follow up appointments.     [facility] [place] [facility]   7/24/24 at 1pm. Cardiology follow up with Dr. Licea. Cardiology RN plans to call patient weekly for updates and close monitoring.     Patient was previously scheduled for follow up with PCP regarding weight  management for 8/6/24 at 1pm. Inquired if patient would like CPN to secure an earlier appointment for post hospital follow up with PCP. Patient declines offer and states that he feels well supported with Cardiology follow up next week.     Patient declines on going Primary Care Coordination needs at this time.    RN Clinical Product Navigator will send FYI to PCP.  How are your symptoms? (Red Flag symptoms escalate to triage hotline per guidelines): Improved  Do you know how to contact your clinic care team if you have future questions or changes to your health status? : Yes  Does the patient have their discharge instructions? : Yes  Does the patient have questions regarding their discharge instructions? : No  Were you started on any new medications or were there changes to any of your previous medications? : Yes  Does the patient have all of their medications?: Yes  Do you have questions regarding any of your medications? : No  Do you have all of your needed medical supplies or equipment (DME)?  (i.e. oxygen tank, CPAP, cane, etc.): Yes              Care Management       Care Mgmt General Assessment  Referral  Referral Source: Health Plan                      Follow up Plan     Discharge Follow-Up  Discharge follow up appointment scheduled in alignment with recommended follow up timeframe or Transitions of Risk Category? (Low = within 30 days; Moderate= within 14 days; High= within 7 days): Yes  Discharge Follow Up Appointment Date: 07/24/24  Discharge Follow Up Appointment Scheduled with?: Specialty Care Provider    Future Appointments   Date Time Provider Department Center   8/6/2024  1:00 PM Wilfrido Baeza DO [facility]       Outpatient Plan as outlined on AVS reviewed with patient.    For any urgent concerns, please contact our 24 hour nurse triage line: [phone] ([phone])       Avani Gardner RN

## 2024-07-16 NOTE — TELEPHONE ENCOUNTER
Please schedule a hospital follow-up so I can determine any tests are needed.  I was able to review his hospital records and cardiology plans to schedule upcoming imaging studies.  May use next available GENIE or RN Triage slot.

## 2024-07-16 NOTE — TELEPHONE ENCOUNTER
Order/Referral Request    Who is requesting: insurance    Orders being requested: Echo/MRI    Reason service is needed/diagnosis: heart attack  on 07/11/2024    When are orders needed by: as soon as possible    Has this been discussed with Provider: No    Does patient have a preference on a Group/Provider/Facility?   [facility]    Does patient have an appointment scheduled?: No    Where to send orders:     Could we send this information to you in [facility] or would you prefer to receive a phone call?:   Patient would prefer a phone call   Okay to leave a detailed message?: Yes at Cell number on file:    Telephone Information:   Mobile [phone]

## 2024-07-18 ENCOUNTER — TELEPHONE (OUTPATIENT)
Dept: FAMILY MEDICINE | Facility: CLINIC | Age: 34
End: 2024-07-18

## 2024-07-18 ENCOUNTER — OFFICE VISIT (OUTPATIENT)
Dept: FAMILY MEDICINE | Facility: CLINIC | Age: 34
End: 2024-07-18
Payer: COMMERCIAL

## 2024-07-18 VITALS
DIASTOLIC BLOOD PRESSURE: 64 MMHG | RESPIRATION RATE: 18 BRPM | OXYGEN SATURATION: 99 % | HEIGHT: 70 IN | WEIGHT: 230.8 LBS | HEART RATE: 70 BPM | SYSTOLIC BLOOD PRESSURE: 112 MMHG | TEMPERATURE: 97.1 F | BODY MASS INDEX: 33.04 KG/M2

## 2024-07-18 DIAGNOSIS — I25.10 CORONARY ARTERY DISEASE INVOLVING NATIVE HEART WITHOUT ANGINA PECTORIS, UNSPECIFIED VESSEL OR LESION TYPE: ICD-10-CM

## 2024-07-18 DIAGNOSIS — I21.4 NSTEMI (NON-ST ELEVATED MYOCARDIAL INFARCTION) (H): Primary | ICD-10-CM

## 2024-07-18 DIAGNOSIS — E66.01 MORBID OBESITY (H): ICD-10-CM

## 2024-07-18 PROCEDURE — 99495 TRANSJ CARE MGMT MOD F2F 14D: CPT | Performed by: FAMILY MEDICINE

## 2024-07-18 ASSESSMENT — PAIN SCALES - GENERAL: PAINLEVEL: NO PAIN (0)

## 2024-07-18 NOTE — PROGRESS NOTES
1. NSTEMI (non-ST elevated myocardial infarction) (H)  S/P MARCE of Proximal 1st diagnoal and distal LAD on 7/12/24.  Continue with dual platelet treatment (aspirin and prasugrel), lipitor, losartan, and metoprolol.  Scheduled to go through cardiac rehab.  Appreciate cardiology follow-up.   - MRI Cardiac w/contrast; Future    2. Morbid obesity (H)  Given patient's history of NSTEMI and CAD, patient would benefit from weight loss.   - tirzepatide-Weight Management (ZEPBOUND) 7.5 MG/0.5ML prefilled pen; Inject 0.5 mLs (7.5 mg) subcutaneously every 7 days  Dispense: 3 mL; Refill: 0    I spent 30 minutes with patient of which 50% was spent counseling and coordinating patient's care as well as discussing patient's plan of care.      Amparo Dumont is a 34 year old, presenting for the following health issues:  Hospital F/U      7/18/2024     7:46 AM   Additional Questions   Roomed by Deidre   Accompanied by Wife Sherley     [facility]          Hospital Follow-up Visit:    Hospital/Nursing Home/IP Rehab Facility:  [facility]   Date of Admission: 7/12/24  Date of Discharge: 7/13/24  Reason(s) for Admission: NSTEMI (non-ST elevated myocardial infarction) (HC)  Active Problems:  NSVT (nonsustained ventricular tachycardia) (HC)  Hyperlipidemia  Obesity   Was the patient in the ICU or did the patient experience delirium during hospitalization?  No  Do you have any other stressors you would like to discuss with your provider? No    Problems taking medications regularly:  None  Medication changes since discharge: Yes multiple  Problems adhering to non-medication therapy:  None    Summary of hospitalization:  CareEverywhere information obtained and reviewed  Diagnostic Tests/Treatments reviewed.  Follow up needed: Cardiac MRI  Other Healthcare Providers Involved in Patient s Care:         Specialist appointment - Cardiology and Cardiac Rehab  Update since discharge: stable.         Plan of care communicated with patient    "          Hospital follow-up: Patient was out for desert.  Felt jaw pain in the evening.  Pain was moving down chest. Patient felt tired and radiated to back.  New symptom.  Burping a lot.  Patient called the ambulance.  Patient took four baby aspirin.  Initial EKG did not show any acute findings.  Patient was transferred to [facility] via ambulance.  Initial troponin were elevated.  Had multiple episodes of sustained with V-tach.  Angiogram shows four blockage.  Patient had MARCE diagonal and distal LAD.  Scheduled for echo MRI.  History of Kawasaki.  As today, states of slight chest discomfort.  No discomfort yesterday.  Patient is currently on prasugrel, metoprolol, losartan, and aspirin.     Non-ST elevation MI  Status post drug-eluting stent to first diagonal and distal LAD.  Staged intervention of RCA ,marginal ,LAD in 2 weeks.           Objective    /64   Pulse 70   Temp 97.1  F (36.2  C) (Temporal)   Resp 18   Ht 1.781 m (5' 10.12\")   Wt 104.7 kg (230 lb 12.8 oz)   SpO2 99%   BMI 33.00 kg/m    Body mass index is 33 kg/m .  Physical Exam  Constitutional:       General: He is not in acute distress.     Appearance: He is well-developed.   HENT:      Head: Normocephalic and atraumatic.      Nose: Nose normal.   Eyes:      Conjunctiva/sclera: Conjunctivae normal.   Neck:      Trachea: No tracheal deviation.   Cardiovascular:      Rate and Rhythm: Normal rate and regular rhythm.      Heart sounds: Normal heart sounds.   Pulmonary:      Effort: Pulmonary effort is normal.      Breath sounds: No wheezing.   Musculoskeletal:         General: Normal range of motion.      Cervical back: Normal range of motion.   Skin:     Findings: No erythema or rash.   Neurological:      Mental Status: He is alert and oriented to person, place, and time.   Psychiatric:         Behavior: Behavior normal.          DIAGNOSTIC - CORONARY   * The left main rises from the aorta in its normal position.   * 70% stenosis in the mid LAD "   * 85% stenosis in the distal LAD   * Acute total occlusion of the proximal 1st Diagonal   * 75% stenosis in the proximal 2nd Diagonal   * 95% stenosis in the distal Circumflex   * Chronic total occlusion (with bridging collaterals) of the distal RCA     INTERVENTION   * Successful angioplasty and stenting (drug eluting) of the proximal 1st diagonal   * Successful angioplasty and stenting (drug eluting) of the distal LAD     Signed Electronically by: Wilfrido Baeza DO

## 2024-07-18 NOTE — TELEPHONE ENCOUNTER
Prior Authorization Retail Medication Request    Medication/Dose: tirzepatide-Weight Management (ZEPBOUND) 7.5 MG/0.5ML prefilled pen    Diagnosis and ICD code (if different than what is on RX):    Morbid obesity (H) [E66.01]     New/renewal/insurance change PA/secondary ins. PA:  Previously Tried and Failed:    Rationale:      Insurance   Primary: MEDICA IFB BOLD   Insurance ID:  [id number]     Secondary (if applicable):  Insurance ID:      Pharmacy Information (if different than what is on RX)  Name:    Phone:    Fax:

## 2024-07-18 NOTE — PATIENT INSTRUCTIONS
Trae Dumont,    Thank you for allowing [facility] to manage your care.    I sent your prescriptions to your pharmacy.    I ordered an cardiac MRI, please call diagnostic imaging [phone] to schedule your test.    For your convenience, test results are released as soon as they are available  Please allow 1-2 business days for me to send you a comment about your results.  If not done so, I encourage you to login into [employer] ([email or web address] to review your results in real time.     If you have any questions or concerns, please feel free to call us at [phone].    Sincerely,    Dr. Baeza    Did you know?      You can schedule a video visit for follow-up appointments as well as future appointments for certain conditions.  Please see the below link.     [email or web address]    If you have not already done so,  I encourage you to sign up for [employer] ([email or web address]).  This will allow you to review your results, securely communicate with a provider, and schedule virtual visits as well.

## 2024-08-06 ENCOUNTER — OFFICE VISIT (OUTPATIENT)
Dept: FAMILY MEDICINE | Facility: CLINIC | Age: 34
End: 2024-08-06
Attending: FAMILY MEDICINE
Payer: COMMERCIAL

## 2024-08-06 VITALS
WEIGHT: 226.8 LBS | HEIGHT: 70 IN | RESPIRATION RATE: 18 BRPM | DIASTOLIC BLOOD PRESSURE: 60 MMHG | OXYGEN SATURATION: 100 % | HEART RATE: 86 BPM | BODY MASS INDEX: 32.47 KG/M2 | SYSTOLIC BLOOD PRESSURE: 108 MMHG | TEMPERATURE: 97.3 F

## 2024-08-06 DIAGNOSIS — I25.10 CORONARY ARTERY DISEASE INVOLVING NATIVE HEART WITHOUT ANGINA PECTORIS, UNSPECIFIED VESSEL OR LESION TYPE: Primary | ICD-10-CM

## 2024-08-06 DIAGNOSIS — E66.01 MORBID OBESITY (H): ICD-10-CM

## 2024-08-06 PROCEDURE — 99214 OFFICE O/P EST MOD 30 MIN: CPT | Performed by: FAMILY MEDICINE

## 2024-08-06 ASSESSMENT — ENCOUNTER SYMPTOMS
ENDOCRINE NEGATIVE: 1
AGITATION: 0
CONFUSION: 0
PALPITATIONS: 0
EYE DISCHARGE: 0
NERVOUS/ANXIOUS: 0
SHORTNESS OF BREATH: 0
COLOR CHANGE: 0
FATIGUE: 0
GASTROINTESTINAL NEGATIVE: 1
HEMATOLOGIC/LYMPHATIC NEGATIVE: 1
DECREASED CONCENTRATION: 0
WHEEZING: 0
DIZZINESS: 0
ALLERGIC/IMMUNOLOGIC NEGATIVE: 1
ACTIVITY CHANGE: 0
HEADACHES: 0
EYE PAIN: 0
APPETITE CHANGE: 0
SEIZURES: 0
COUGH: 0
HALLUCINATIONS: 0
FEVER: 0

## 2024-08-06 ASSESSMENT — ASTHMA QUESTIONNAIRES
QUESTION_3 LAST FOUR WEEKS HOW OFTEN DID YOUR ASTHMA SYMPTOMS (WHEEZING, COUGHING, SHORTNESS OF BREATH, CHEST TIGHTNESS OR PAIN) WAKE YOU UP AT NIGHT OR EARLIER THAN USUAL IN THE MORNING: NOT AT ALL
QUESTION_4 LAST FOUR WEEKS HOW OFTEN HAVE YOU USED YOUR RESCUE INHALER OR NEBULIZER MEDICATION (SUCH AS ALBUTEROL): NOT AT ALL
QUESTION_1 LAST FOUR WEEKS HOW MUCH OF THE TIME DID YOUR ASTHMA KEEP YOU FROM GETTING AS MUCH DONE AT WORK, SCHOOL OR AT HOME: NONE OF THE TIME
ACT_TOTALSCORE: 25
QUESTION_5 LAST FOUR WEEKS HOW WOULD YOU RATE YOUR ASTHMA CONTROL: COMPLETELY CONTROLLED
ACT_TOTALSCORE: 25
QUESTION_2 LAST FOUR WEEKS HOW OFTEN HAVE YOU HAD SHORTNESS OF BREATH: NOT AT ALL

## 2024-08-06 NOTE — LETTER
My Asthma Action Plan    Name: Tim Sigala   YOB: 1990  Date: 8/6/2024   My doctor: Wilfrido Baeza,    [facility]        My Rescue Medicine:   Albuterol inhaler (Proair/Ventolin/Proventil HFA)  2-4 puffs EVERY 4 HOURS as needed. Use a spacer if recommended by your provider.   My Asthma Severity:   Intermittent / Exercise Induced  Know your asthma triggers:                GREEN ZONE   Good Control  I feel good  No cough or wheeze  Can work, sleep and play without asthma symptoms       Take your asthma control medicine every day.     If exercise triggers your asthma, take your rescue medication  15 minutes before exercise or sports, and  During exercise if you have asthma symptoms  Spacer to use with inhaler: If you have a spacer, make sure to use it with your inhaler             YELLOW ZONE Getting Worse  I have ANY of these:  I do not feel good  Cough or wheeze  Chest feels tight  Wake up at night   Keep taking your Green Zone medications  Start taking your rescue medicine:  every 20 minutes for up to 1 hour. Then every 4 hours for 24-48 hours.  If you stay in the Yellow Zone for more than 12-24 hours, contact your doctor.  If you do not return to the Green Zone in 12-24 hours or you get worse, start taking your oral steroid medicine if prescribed by your provider.           RED ZONE Medical Alert - Get Help  I have ANY of these:  I feel awful  Medicine is not helping  Breathing getting harder  Trouble walking or talking  Nose opens wide to breathe       Take your rescue medicine NOW  If your provider has prescribed an oral steroid medicine, start taking it NOW  Call your doctor NOW  If you are still in the Red Zone after 20 minutes and you have not reached your doctor:  Take your rescue medicine again and  Call 911 or go to the emergency room right away    See your regular doctor within 2 weeks of an Emergency Room or Urgent Care visit for follow-up treatment.           Annual Reminders:  Meet with Asthma Educator,  Flu Shot in the Fall, consider Pneumonia Vaccination for patients with asthma (aged 19 and older).    Pharmacy:    [id number] - [place], [place] - [address] [facility] - [place], [place] - [address] [address]    Electronically signed by Wilfrido Baeza,    Date: 08/06/24                    Asthma Triggers  How To Control Things That Make Your Asthma Worse    Triggers are things that make your asthma worse.  Look at the list below to help you find your triggers and   what you can do about them. You can help prevent asthma flare-ups by staying away from your triggers.      Trigger                                                          What you can do   Cigarette Smoke  Tobacco smoke can make asthma worse. Do not allow smoking in your home, car or around you.  Be sure no one smokes at a child s day care or school.  If you smoke, ask your health care provider for ways to help you quit.  Ask family members to quit too.  Ask your health care provider for a referral to Quit Plan to help you quit smoking, or call 5-329-207-PLAN.     Colds, Flu, Bronchitis  These are common triggers of asthma. Wash your hands often.  Don t touch your eyes, nose or mouth.  Get a flu shot every year.     Dust Mites  These are tiny bugs that live in cloth or carpet. They are too small to see. Wash sheets and blankets in hot water every week.   Encase pillows and mattress in dust mite proof covers.  Avoid having carpet if you can. If you have carpet, vacuum weekly.   Use a dust mask and HEPA vacuum.   Pollen and Outdoor Mold  Some people are allergic to trees, grass, or weed pollen, or molds. Try to keep your windows closed.  Limit time out doors when pollen count is high.   Ask you health care provider about taking medicine during allergy season.     Animal Dander  Some people are allergic to skin flakes, urine or saliva from pets  with fur or feathers. Keep pets with fur or feathers out of your home.    If you can t keep the pet outdoors, then keep the pet out of your bedroom.  Keep the bedroom door closed.  Keep pets off cloth furniture and away from stuffed toys.     Mice, Rats, and Cockroaches  Some people are allergic to the waste from these pests.   Cover food and garbage.  Clean up spills and food crumbs.  Store grease in the refrigerator.   Keep food out of the bedroom.   Indoor Mold  This can be a trigger if your home has high moisture. Fix leaking faucets, pipes, or other sources of water.   Clean moldy surfaces.  Dehumidify basement if it is damp and smelly.   Smoke, Strong Odors, and Sprays  These can reduce air quality. Stay away from strong odors and sprays, such as perfume, powder, hair spray, paints, smoke incense, paint, cleaning products, candles and new carpet.   Exercise or Sports  Some people with asthma have this trigger. Be active!  Ask your doctor about taking medicine before sports or exercise to prevent symptoms.    Warm up for 5-10 minutes before and after sports or exercise.     Other Triggers of Asthma  Cold air:  Cover your nose and mouth with a scarf.  Sometimes laughing or crying can be a trigger.  Some medicines and food can trigger asthma.

## 2024-08-06 NOTE — PROGRESS NOTES
1. Coronary artery disease involving native heart without angina pectoris, unspecified vessel or lesion type  Chronic.  S/P proximal 1st diagnal and distal LAD on 7/12/24.  Pending cardiac MRI results today.  Appreciate cardiology follow-up.  Continue with cardiac rehab.  Stressed the importance of continuing with current medications.     2. Morbid obesity (H)  Unable to get Zepbound approved. With patient's BMI and cardiac history, patient would benefit from weight loss with Wegovy.   - Semaglutide-Weight Management (WEGOVY) 1 MG/0.5ML pen; Inject 1 mg subcutaneously once a week  Dispense: 2 mL; Refill: 0    I spent 30 minutes with patient of which 50% was spent counseling and coordinating patient's care as well as discussing patient's plan of care.      Amparo Dumont is a 34 year old, presenting for the following health issues:  Weight Check      8/6/2024    12:45 PM   Additional Questions   Roomed by Deidre   Accompanied by Wife- Sherley     History of Present Illness       Vascular Disease:  He presents for follow up of vascular disease.      He takes daily aspirin.    He eats 2-3 servings of fruits and vegetables daily.He consumes 0 sweetened beverage(s) daily.He exercises with enough effort to increase his heart rate 30 to 60 minutes per day.  He exercises with enough effort to increase his heart rate 4 days per week.   He is taking medications regularly.       Patient here for weight check today, he states his Zepbound was denied     CAD follow-up: S/P proximal 1st diagnal and distal LAD on 7/12/24.  Tolerating medications.  No side effects.  Had cardiac MRI, pending results.  No new chest pain symptoms.  Scheduled for follow-up with cardiology.  States of increase in burps that is relieved omeprazole 40 mg daily.     2. Overweight: Unable to get Zepbound and Wegovy approved.  Currently getting Zepbound at a clinic.     Review of Systems   Constitutional:  Negative for activity change, appetite change,  "fatigue and fever.   HENT: Negative.     Eyes:  Negative for pain, discharge and visual disturbance.   Respiratory:  Negative for cough, shortness of breath and wheezing.    Cardiovascular:  Negative for chest pain, palpitations and leg swelling.   Gastrointestinal: Negative.    Endocrine: Negative.    Genitourinary: Negative.    Skin:  Negative for color change and rash.   Allergic/Immunologic: Negative.    Neurological:  Negative for dizziness, seizures and headaches.   Hematological: Negative.    Psychiatric/Behavioral:  Negative for agitation, confusion, decreased concentration and hallucinations. The patient is not nervous/anxious.          Objective    /60   Pulse 86   Temp 97.3  F (36.3  C) (Temporal)   Resp 18   Ht 1.781 m (5' 10.12\")   Wt 102.9 kg (226 lb 12.8 oz)   SpO2 100%   BMI 32.43 kg/m    Body mass index is 32.43 kg/m .  Physical Exam  Constitutional:       General: He is not in acute distress.  HENT:      Head: Normocephalic and atraumatic.   Eyes:      Conjunctiva/sclera: Conjunctivae normal.   Cardiovascular:      Rate and Rhythm: Normal rate and regular rhythm.      Heart sounds: Normal heart sounds. No murmur heard.  Pulmonary:      Effort: Pulmonary effort is normal. No respiratory distress.      Breath sounds: No wheezing or rales.   Musculoskeletal:         General: Normal range of motion.   Skin:     Findings: No rash.   Neurological:      Mental Status: He is alert and oriented to person, place, and time.                Signed Electronically by: Wilfrido Baeza DO    "

## 2024-08-06 NOTE — PATIENT INSTRUCTIONS
Trae Dumont,    Thank you for allowing [facility] to manage your care.    May increase omeprazole to 40 mg twice a day for one and then, return to daily.     If you have any questions or concerns, please feel free to call us at [phone].    Sincerely,    Dr. Baeza    Did you know?      You can schedule a video visit for follow-up appointments as well as future appointments for certain conditions.  Please see the below link.     [email or web address]    If you have not already done so,  I encourage you to sign up for [email or web address] ([email or web address]).  This will allow you to review your results, securely communicate with a provider, and schedule virtual visits as well.

## 2024-08-07 ENCOUNTER — TELEPHONE (OUTPATIENT)
Dept: FAMILY MEDICINE | Facility: CLINIC | Age: 34
End: 2024-08-07

## 2024-08-07 NOTE — TELEPHONE ENCOUNTER
Prior Authorization Retail Medication Request    Medication/Dose: Semaglutide-Weight Management (WEGOVY) 1 MG/0.5ML pen  Diagnosis and ICD code (if different than what is on RX):  E66.01   New/renewal/insurance change PA/secondary ins. PA:  Previously Tried and Failed:  na  Rationale:  na    Insurance   Primary: MEDICA  Insurance ID:  [id number]     Secondary (if applicable):na  Insurance ID:  na    Pharmacy Information (if different than what is on RX)  Name:   [facility]  Phone:  [phone]  Fax:      [phone]

## 2024-08-08 NOTE — TELEPHONE ENCOUNTER
PRIOR AUTHORIZATION DENIED    Medication: WEGOVY 1 MG/0.5ML SC SOAJ  Insurance Company: [employer] - Phone [phone] Fax [phone]  Denial Date: 8/8/2024  Denial Reason(s): Plan Exclusion    Appeal Information: NA  Patient Notified: No

## 2024-09-08 ENCOUNTER — MYC REFILL (OUTPATIENT)
Dept: FAMILY MEDICINE | Facility: CLINIC | Age: 34
End: 2024-09-08
Payer: COMMERCIAL

## 2024-09-08 DIAGNOSIS — K21.00 GASTROESOPHAGEAL REFLUX DISEASE WITH ESOPHAGITIS WITHOUT HEMORRHAGE: ICD-10-CM

## 2024-09-09 RX ORDER — OMEPRAZOLE 40 MG/1
40 CAPSULE, DELAYED RELEASE ORAL DAILY
Qty: 90 CAPSULE | Refills: 0 | Status: SHIPPED | OUTPATIENT
Start: 2024-09-09

## 2024-09-17 ENCOUNTER — TELEPHONE (OUTPATIENT)
Dept: FAMILY MEDICINE | Facility: CLINIC | Age: 34
End: 2024-09-17
Payer: COMMERCIAL

## 2024-09-17 NOTE — TELEPHONE ENCOUNTER
[facility] calling stating pt is out of network for cardiac imaging that was placed.     RN advised for this team to call cardiology as pt has been following them closely recently and upon chart review we did not place any cardiac imaging.     Billing/ coverage verbalized understanding and in agreement with plan of care.     Leila Horn RN

## 2024-10-25 DIAGNOSIS — K21.00 GASTROESOPHAGEAL REFLUX DISEASE WITH ESOPHAGITIS WITHOUT HEMORRHAGE: ICD-10-CM

## 2024-10-28 RX ORDER — OMEPRAZOLE 40 MG/1
40 CAPSULE, DELAYED RELEASE ORAL DAILY
Qty: 90 CAPSULE | Refills: 0 | Status: SHIPPED | OUTPATIENT
Start: 2024-10-28 | End: 2024-11-04

## 2024-12-12 ENCOUNTER — VIRTUAL VISIT (OUTPATIENT)
Dept: FAMILY MEDICINE | Facility: CLINIC | Age: 34
End: 2024-12-12
Payer: COMMERCIAL

## 2024-12-12 DIAGNOSIS — I25.10 CORONARY ARTERY DISEASE INVOLVING NATIVE CORONARY ARTERY OF NATIVE HEART WITHOUT ANGINA PECTORIS: Primary | ICD-10-CM

## 2024-12-12 DIAGNOSIS — N62 GYNECOMASTIA: ICD-10-CM

## 2024-12-12 DIAGNOSIS — E66.01 MORBID OBESITY (H): ICD-10-CM

## 2024-12-12 RX ORDER — TIRZEPATIDE 10 MG/.5ML
10 INJECTION, SOLUTION SUBCUTANEOUS
COMMUNITY
Start: 2024-12-12

## 2024-12-12 ASSESSMENT — ENCOUNTER SYMPTOMS
ACTIVITY CHANGE: 0
SHORTNESS OF BREATH: 0
SEIZURES: 0
NERVOUS/ANXIOUS: 0
HEADACHES: 0
EYE PAIN: 0
HALLUCINATIONS: 0
COLOR CHANGE: 0
ENDOCRINE NEGATIVE: 1
GASTROINTESTINAL NEGATIVE: 1
FEVER: 0
ALLERGIC/IMMUNOLOGIC NEGATIVE: 1
DIZZINESS: 0
EYE DISCHARGE: 0
COUGH: 0
PALPITATIONS: 0
DECREASED CONCENTRATION: 0
APPETITE CHANGE: 0
HEMATOLOGIC/LYMPHATIC NEGATIVE: 1
WHEEZING: 0
AGITATION: 0
CONFUSION: 0
FATIGUE: 0

## 2024-12-12 NOTE — PROGRESS NOTES
Tim is a 34 year old who is being evaluated via a billable video visit.    How would you like to obtain your AVS? Streame[employer]art  If the video visit is dropped, the invitation should be resent by: Text to cell phone: [phone]  Will anyone else be joining your video visit? No      1. Coronary artery disease involving native coronary artery of native heart without angina pectoris (Primary)  S/P 2V angioplasty with two MARCE.  Appreciate cardiology follow-up.  Most recent echo results reviewed.  Continue with Jardiance, Repatha, and Atorvastatin.    2. Morbid obesity (H)  With history CAD.  Would benefit from a GLP-1.  Advised not to take Mounjaro while on Wegovy.   - semaglutide-weight management (WEGOVY) 0.25 MG/0.5ML pen; Inject 0.5 mLs (0.25 mg) subcutaneously once a week.  Dispense: 2 mL; Refill: 0    3. Gynecomastia  Supportive care for now.  Continue with weight loss.  Consider plastic surgery referral if needed.       Subjective   Tim is a 34 year old, presenting for the following health issues:  Consult (Weight and hormones )        12/12/2024     8:42 AM   Additional Questions   Roomed by Patient reviewed chart via [email or web address]   Accompanied by n/a         12/12/2024     8:42 AM   Patient Reported Additional Medications   Patient reports taking the following new medications n/a     History of Present Illness       Reason for visit:  Hormones  Symptom onset:  More than a month  Symptoms include:  Man boobs  Symptom intensity:  Moderate  Symptom progression:  Staying the same  Had these symptoms before:  Yes  Has tried/received treatment for these symptoms:  No   He is taking medications regularly.     CAD: Patient completed cardiac rehab.  Patient was started on Repatha 140 mg every 2 weeks.  Started on Jardiance 10 mg daily.  Patient continues to exercise.  Last echo has been stable.  Patient is down to 208 lbs.  Patient has been watching his diet and exercising.     2. Overweight: Patient is getting Mounjaro via  third party.  Trying to get Wegovy approved.     3. Gynecomastia: States that chest region continues to flabby.  States of excess fat regarding the chest region.       Review of Systems   Constitutional:  Negative for activity change, appetite change, fatigue and fever.   HENT: Negative.     Eyes:  Negative for pain, discharge and visual disturbance.   Respiratory:  Negative for cough, shortness of breath and wheezing.    Cardiovascular:  Negative for chest pain, palpitations and leg swelling.   Gastrointestinal: Negative.    Endocrine: Negative.    Genitourinary: Negative.    Skin:  Negative for color change and rash.   Allergic/Immunologic: Negative.    Neurological:  Negative for dizziness, seizures and headaches.   Hematological: Negative.    Psychiatric/Behavioral:  Negative for agitation, confusion, decreased concentration and hallucinations. The patient is not nervous/anxious.          Objective       Vitals:  No vitals were obtained today due to virtual visit.    Physical Exam   GENERAL: alert and no distress  EYES: Eyes grossly normal to inspection.  No discharge or erythema, or obvious scleral/conjunctival abnormalities.  RESP: No audible wheeze, cough, or visible cyanosis.    SKIN: Visible skin clear. No significant rash, abnormal pigmentation or lesions.  NEURO: Cranial nerves grossly intact.  Mentation and speech appropriate for age.  PSYCH: Appropriate affect, tone, and pace of words        Video-Visit Details    Type of service:  Video Visit   Originating Location (pt. Location): Home    Distant Location (provider location):  On-site  Platform used for Video Visit: Gt  Signed Electronically by: Wilfrido Baeza DO

## 2024-12-31 ENCOUNTER — OFFICE VISIT (OUTPATIENT)
Dept: PODIATRY | Facility: CLINIC | Age: 34
End: 2024-12-31
Payer: COMMERCIAL

## 2024-12-31 DIAGNOSIS — B35.1 ONYCHOMYCOSIS: Primary | ICD-10-CM

## 2024-12-31 DIAGNOSIS — B35.3 TINEA PEDIS OF BOTH FEET: ICD-10-CM

## 2024-12-31 LAB
ALBUMIN SERPL BCG-MCNC: 4.5 G/DL (ref 3.5–5.2)
ALP SERPL-CCNC: 54 U/L (ref 40–150)
ALT SERPL W P-5'-P-CCNC: 28 U/L (ref 0–70)
AST SERPL W P-5'-P-CCNC: 24 U/L (ref 0–45)
BILIRUB DIRECT SERPL-MCNC: <0.2 MG/DL (ref 0–0.3)
BILIRUB SERPL-MCNC: 0.4 MG/DL
PROT SERPL-MCNC: 7.4 G/DL (ref 6.4–8.3)

## 2024-12-31 PROCEDURE — 36415 COLL VENOUS BLD VENIPUNCTURE: CPT | Performed by: PODIATRIST

## 2024-12-31 PROCEDURE — 99204 OFFICE O/P NEW MOD 45 MIN: CPT | Performed by: PODIATRIST

## 2024-12-31 PROCEDURE — 80076 HEPATIC FUNCTION PANEL: CPT | Performed by: PODIATRIST

## 2024-12-31 RX ORDER — ECONAZOLE NITRATE 10 MG/G
CREAM TOPICAL DAILY
Qty: 85 G | Refills: 5 | Status: SHIPPED | OUTPATIENT
Start: 2024-12-31

## 2024-12-31 RX ORDER — TERBINAFINE HYDROCHLORIDE 250 MG/1
250 TABLET ORAL DAILY
Qty: 90 TABLET | Refills: 0 | Status: SHIPPED | OUTPATIENT
Start: 2024-12-31 | End: 2025-03-31

## 2024-12-31 NOTE — NURSING NOTE
Tim Sigala's chief complaint for this visit includes:  Chief Complaint   Patient presents with    Consult     Toe nail, bilateral      PCP: Wilfrido Baeza    Referring Provider:  Referred Self, MD  No address on file    There were no vitals taken for this visit.  Data Unavailable     Do you need any medication refills at today's visit? NO    No Known Allergies    Loretta Winston LPN

## 2024-12-31 NOTE — PROGRESS NOTES
Past Medical History:   Diagnosis Date    Uncomplicated asthma 1994     Patient Active Problem List   Diagnosis    Morbid obesity (H)    Asthma    Acne    Hyperlipidemia LDL goal <160    Coronary artery disease involving native heart without angina pectoris, unspecified vessel or lesion type     Past Surgical History:   Procedure Laterality Date    HC REMOVAL OF TONSILS,<13 Y/O      Description: Tonsillectomy;  Recorded: 02/12/2013;     Social History     Socioeconomic History    Marital status:      Spouse name: Not on file    Number of children: 0    Years of education: Not on file    Highest education level: Not on file   Occupational History    Occupation: Construction   Tobacco Use    Smoking status: Never     Passive exposure: Never    Smokeless tobacco: Never   Vaping Use    Vaping status: Never Used   Substance and Sexual Activity    Alcohol use: Not Currently     Comment: rarely, once a few months    Drug use: Never    Sexual activity: Yes     Partners: Female     Birth control/protection: Condom   Other Topics Concern    Parent/sibling w/ CABG, MI or angioplasty before 65F 55M? No   Social History Narrative    Not on file     Social Drivers of Health     Financial Resource Strain: Low Risk  (7/12/2024)    [facility]    Financial Resource Strain     Difficulty of Paying Living Expenses: 3     Difficulty of Paying Living Expenses: Not on file   Food Insecurity: No Food Insecurity (7/12/2024)    Received from [facility]    Food Insecurity     Do you worry your food will run out before you are able to buy more?: 1   Transportation Needs: No Transportation Needs (7/12/2024)    Received from [facility]    Transportation Needs     Does lack of transportation keep you from medical appointments?: 1     Does lack of transportation keep you from work, meetings or getting things that you need?: 1  "  Physical Activity: Sufficiently Active (2/12/2024)    Exercise Vital Sign     Days of Exercise per Week: 5 days     Minutes of Exercise per Session: 60 min   Stress: No Stress Concern Present (2/12/2024)    [employer] - Occupational Stress Questionnaire     Feeling of Stress : Only a little   Social Connections: Socially Integrated (7/12/2024)    Received from [email or web address]    Social Connections     Do you often feel lonely or isolated from those around you?: 0   Interpersonal Safety: Not on file   Housing Stability: Low Risk  (7/12/2024)    Received from [email or web address]    Housing Stability     What is your housing situation today?: 1     Family History   Problem Relation Age of Onset    Hyperlipidemia Mother     Hyperlipidemia Father     Thyroid Disease Father     Diabetes Paternal Grandfather     Diabetes Paternal Grandmother      12/20/2024 creatinine 1.06 as noted in the EMR.    Lab Results   Component Value Date    AST 24 12/31/2024     Lab Results   Component Value Date    ALT 28 12/31/2024     No results found for: \"BILICONJ\"   Lab Results   Component Value Date    BILITOTAL 0.4 12/31/2024     Lab Results   Component Value Date    ALBUMIN 4.5 12/31/2024     Lab Results   Component Value Date    PROTTOTAL 7.4 12/31/2024      Lab Results   Component Value Date    ALKPHOS 54 12/31/2024                       Subjective findings- 34-year-old presents for toenail fungus.  Relates has been present for 4 to 5 years duration, has used multiple over-the-counter medications without resolution, used Penlac from a doctor without resolution, relates they do not hurt other than the left third 1 hurts at times when it hits the shoe, relates to no injuries, relates now he starting getting dry scaly skin on his feet, relates to no history of liver disease.    Objective findings- DP and PT are 2 out of 4 bilaterally.  Has dystrophic " discolored thickened nails with subungual debris dystrophy and discoloration to differing degrees bilaterally.  Has dry scaly skin in moccasin pattern bilaterally.  There is no erythema, no edema, no drainage, no odor, no calor, no pain on palpation, no tendon voids bilaterally.    Assessment and plan- Onychomycosis bilaterally.  Tinea pedis bilaterally.  Diagnosis and treatment options discussed with the patient.  Hepatic panel labs ordered, reviewed and use discussed with the patient.  Hepatic panel labs within normal limits.  Prescription for Lamisil oral medication given and use discussed with the patient.  Prescription for Econazole cream given and use discussed with the patient.  Return to clinic in 6 weeks.                                                          Moderate level of medical decision making.

## 2024-12-31 NOTE — LETTER
12/31/2024      Tim Sigala  [id number]  [place] [address]      Dear Colleague,    Thank you for referring your patient, Tim Sigala, to the [facility]. Please see a copy of my visit note below.    Past Medical History:   Diagnosis Date     Uncomplicated asthma 1994     Patient Active Problem List   Diagnosis     Morbid obesity (H)     Asthma     Acne     Hyperlipidemia LDL goal <160     Coronary artery disease involving native heart without angina pectoris, unspecified vessel or lesion type     Past Surgical History:   Procedure Laterality Date     HC REMOVAL OF TONSILS,<11 Y/O      Description: Tonsillectomy;  Recorded: 02/12/2013;     Social History     Socioeconomic History     Marital status:      Spouse name: Not on file     Number of children: 0     Years of education: Not on file     Highest education level: Not on file   Occupational History     Occupation: Construction   Tobacco Use     Smoking status: Never     Passive exposure: Never     Smokeless tobacco: Never   Vaping Use     Vaping status: Never Used   Substance and Sexual Activity     Alcohol use: Not Currently     Comment: rarely, once a few months     Drug use: Never     Sexual activity: Yes     Partners: Female     Birth control/protection: Condom   Other Topics Concern     Parent/sibling w/ CABG, MI or angioplasty before 65F 55M? No   Social History Narrative     Not on file     Social Drivers of Health     Financial Resource Strain: Low Risk  (7/12/2024)    Received from [employer]    Financial Resource Strain      Difficulty of Paying Living Expenses: 3      Difficulty of Paying Living Expenses: Not on file   Food Insecurity: No Food Insecurity (7/12/2024)    [facility]    Food Insecurity      Do you worry your food will run out before you are able to buy more?: 1   Transportation Needs: No Transportation Needs  "(7/12/2024)    Received from [employer]    Transportation Needs      Does lack of transportation keep you from medical appointments?: 1      Does lack of transportation keep you from work, meetings or getting things that you need?: 1   Physical Activity: Sufficiently Active (2/12/2024)    Exercise Vital Sign      Days of Exercise per Week: 5 days      Minutes of Exercise per Session: 60 min   Stress: No Stress Concern Present (2/12/2024)    [employer] - Occupational Stress Questionnaire      Feeling of Stress : Only a little   Social Connections: Socially Integrated (7/12/2024)    [facility]    Social Connections      Do you often feel lonely or isolated from those around you?: 0   Interpersonal Safety: Not on file   Housing Stability: Low Risk  (7/12/2024)    Received from [employer]    Housing Stability      What is your housing situation today?: 1     Family History   Problem Relation Age of Onset     Hyperlipidemia Mother      Hyperlipidemia Father      Thyroid Disease Father      Diabetes Paternal Grandfather      Diabetes Paternal Grandmother      12/20/2024 creatinine 1.06 as noted in the EMR.    Lab Results   Component Value Date    AST 24 12/31/2024     Lab Results   Component Value Date    ALT 28 12/31/2024     No results found for: \"BILICONJ\"   Lab Results   Component Value Date    BILITOTAL 0.4 12/31/2024     Lab Results   Component Value Date    ALBUMIN 4.5 12/31/2024     Lab Results   Component Value Date    PROTTOTAL 7.4 12/31/2024      Lab Results   Component Value Date    ALKPHOS 54 12/31/2024                       Subjective findings- 34-year-old presents for toenail fungus.  Relates has been present for 4 to 5 years duration, has used multiple over-the-counter medications without resolution, used Penlac from a doctor without resolution, relates they do " not hurt other than the left third 1 hurts at times when it hits the shoe, relates to no injuries, relates now he starting getting dry scaly skin on his feet, relates to no history of liver disease.    Objective findings- DP and PT are 2 out of 4 bilaterally.  Has dystrophic discolored thickened nails with subungual debris dystrophy and discoloration to differing degrees bilaterally.  Has dry scaly skin in moccasin pattern bilaterally.  There is no erythema, no edema, no drainage, no odor, no calor, no pain on palpation, no tendon voids bilaterally.    Assessment and plan- Onychomycosis bilaterally.  Tinea pedis bilaterally.  Diagnosis and treatment options discussed with the patient.  Hepatic panel labs ordered, reviewed and use discussed with the patient.  Hepatic panel labs within normal limits.  Prescription for Lamisil oral medication given and use discussed with the patient.  Prescription for Econazole cream given and use discussed with the patient.  Return to clinic in 6 weeks.                                                          Moderate level of medical decision making.      Again, thank you for allowing me to participate in the care of your patient.        Sincerely,        Gary Marcus DPM    Electronically signed

## 2025-01-14 ENCOUNTER — PATIENT OUTREACH (OUTPATIENT)
Dept: CARE COORDINATION | Facility: CLINIC | Age: 35
End: 2025-01-14
Payer: COMMERCIAL

## 2025-01-28 ENCOUNTER — PATIENT OUTREACH (OUTPATIENT)
Dept: CARE COORDINATION | Facility: CLINIC | Age: 35
End: 2025-01-28

## 2025-01-28 ENCOUNTER — OFFICE VISIT (OUTPATIENT)
Dept: PODIATRY | Facility: CLINIC | Age: 35
End: 2025-01-28
Payer: COMMERCIAL

## 2025-01-28 DIAGNOSIS — K21.00 GASTROESOPHAGEAL REFLUX DISEASE WITH ESOPHAGITIS WITHOUT HEMORRHAGE: ICD-10-CM

## 2025-01-28 DIAGNOSIS — B35.3 TINEA PEDIS OF BOTH FEET: ICD-10-CM

## 2025-01-28 DIAGNOSIS — B35.1 ONYCHOMYCOSIS: Primary | ICD-10-CM

## 2025-01-28 LAB
ALBUMIN SERPL BCG-MCNC: 4.3 G/DL (ref 3.5–5.2)
ALP SERPL-CCNC: 58 U/L (ref 40–150)
ALT SERPL W P-5'-P-CCNC: 28 U/L (ref 0–70)
AST SERPL W P-5'-P-CCNC: 23 U/L (ref 0–45)
BILIRUB DIRECT SERPL-MCNC: <0.2 MG/DL (ref 0–0.3)
BILIRUB SERPL-MCNC: 0.3 MG/DL
PROT SERPL-MCNC: 7.2 G/DL (ref 6.4–8.3)

## 2025-01-28 RX ORDER — OMEPRAZOLE 40 MG/1
40 CAPSULE, DELAYED RELEASE ORAL 2 TIMES DAILY
Qty: 180 CAPSULE | Refills: 0 | Status: SHIPPED | OUTPATIENT
Start: 2025-01-28

## 2025-01-28 NOTE — NURSING NOTE
Tim Sigala's chief complaint for this visit includes:  Chief Complaint   Patient presents with    RECHECK     Toenail fungus follow up     PCP: Wilfrido Baeza    Referring Provider:  Referred Self, MD  No address on file    There were no vitals taken for this visit.  Data Unavailable     Do you need any medication refills at today's visit? NO    No Known Allergies    Loretta Winston LPN

## 2025-01-28 NOTE — PROGRESS NOTES
Past Medical History:   Diagnosis Date    Uncomplicated asthma 1994     Patient Active Problem List   Diagnosis    Morbid obesity (H)    Asthma    Acne    Hyperlipidemia LDL goal <160    Coronary artery disease involving native heart without angina pectoris, unspecified vessel or lesion type     Past Surgical History:   Procedure Laterality Date    HC REMOVAL OF TONSILS,<9 Y/O      Description: Tonsillectomy;  Recorded: 02/12/2013;     Social History     Socioeconomic History    Marital status:      Spouse name: Not on file    Number of children: 0    Years of education: Not on file    Highest education level: Not on file   Occupational History    Occupation: Construction   Tobacco Use    Smoking status: Never     Passive exposure: Never    Smokeless tobacco: Never   Vaping Use    Vaping status: Never Used   Substance and Sexual Activity    Alcohol use: Not Currently     Comment: rarely, once a few months    Drug use: Never    Sexual activity: Yes     Partners: Female     Birth control/protection: Condom   Other Topics Concern    Parent/sibling w/ CABG, MI or angioplasty before 65F 55M? No   Social History Narrative    Not on file     Social Drivers of Health     Financial Resource Strain: Low Risk  (7/12/2024)    [facility]    Financial Resource Strain     Difficulty of Paying Living Expenses: 3     Difficulty of Paying Living Expenses: Not on file   Food Insecurity: No Food Insecurity (7/12/2024)    Received from [facility]    Food Insecurity     Do you worry your food will run out before you are able to buy more?: 1   Transportation Needs: No Transportation Needs (7/12/2024)    Received from [facility]    Transportation Needs     Does lack of transportation keep you from medical appointments?: 1     Does lack of transportation keep you from work, meetings or getting things that you need?: 1  "  Physical Activity: Sufficiently Active (2/12/2024)    Exercise Vital Sign     Days of Exercise per Week: 5 days     Minutes of Exercise per Session: 60 min   Stress: No Stress Concern Present (2/12/2024)    [employer] - Occupational Stress Questionnaire     Feeling of Stress : Only a little   Social Connections: Socially Integrated (7/12/2024)    Received from [employer]    Social Connections     Do you often feel lonely or isolated from those around you?: 0   Interpersonal Safety: Not on file   Housing Stability: Low Risk  (7/12/2024)    Received from [employer]    Housing Stability     What is your housing situation today?: 1     Family History   Problem Relation Age of Onset    Hyperlipidemia Mother     Hyperlipidemia Father     Thyroid Disease Father     Diabetes Paternal Grandfather     Diabetes Paternal Grandmother          Lab Results   Component Value Date    AST 24 12/31/2024     Lab Results   Component Value Date    ALT 28 12/31/2024     No results found for: \"BILICONJ\"   Lab Results   Component Value Date    BILITOTAL 0.4 12/31/2024     Lab Results   Component Value Date    ALBUMIN 4.5 12/31/2024     Lab Results   Component Value Date    PROTTOTAL 7.4 12/31/2024      Lab Results   Component Value Date    ALKPHOS 54 12/31/2024     Lab Results   Component Value Date    AST 23 01/28/2025     Lab Results   Component Value Date    ALT 28 01/28/2025     No results found for: \"BILICONJ\"   Lab Results   Component Value Date    BILITOTAL 0.3 01/28/2025     Lab Results   Component Value Date    ALBUMIN 4.3 01/28/2025     Lab Results   Component Value Date    PROTTOTAL 7.2 01/28/2025      Lab Results   Component Value Date    ALKPHOS 58 01/28/2025                           Subjective findings- 34-year-old returns clinic for onychomycosis and tinea pedis bilaterally.  Relates to taking the oral Lamisil medication with " no problems.  Relates to using the econazole cream with no problems.    Objective findings- Vascular status intact bilaterally.  Has dystrophic discolored thickened nails with subungual debris and dystrophy to differing degrees bilaterally with proximal nail growing and clear on the hallux nails bilaterally.  Has decreased dry scaly skin in moccasin pattern bilaterally.    Assessment and plan- Onychomycosis bilaterally.  Tinea Pedis bilaterally.  These are improving.  Diagnosis and treatment options discussed with the patient.  Hepatic panel labs ordered, reviewed and use discussed with the patient.  Hepatic panel labs are within normal limits.  He relates he is okay with us my charting and the results.  Finish Lamisil oral medication.  Continue the Econazole cream.  Return to clinic and see me as needed.                              Low to moderate level of medical decision making.

## 2025-01-28 NOTE — LETTER
1/28/2025      Tim Sigala  [address] [place] [address]      Dear Colleague,    Thank you for referring your patient, Tim Sigala, to the [facility]. Please see a copy of my visit note below.    Past Medical History:   Diagnosis Date    Uncomplicated asthma 1994     Patient Active Problem List   Diagnosis    Morbid obesity (H)    Asthma    Acne    Hyperlipidemia LDL goal <160    Coronary artery disease involving native heart without angina pectoris, unspecified vessel or lesion type     Past Surgical History:   Procedure Laterality Date    HC REMOVAL OF TONSILS,<9 Y/O      Description: Tonsillectomy;  Recorded: 02/12/2013;     Social History     Socioeconomic History    Marital status:      Spouse name: Not on file    Number of children: 0    Years of education: Not on file    Highest education level: Not on file   Occupational History    Occupation: Construction   Tobacco Use    Smoking status: Never     Passive exposure: Never    Smokeless tobacco: Never   Vaping Use    Vaping status: Never Used   Substance and Sexual Activity    Alcohol use: Not Currently     Comment: rarely, once a few months    Drug use: Never    Sexual activity: Yes     Partners: Female     Birth control/protection: Condom   Other Topics Concern    Parent/sibling w/ CABG, MI or angioplasty before 65F 55M? No   Social History Narrative    Not on file     Social Drivers of Health     Financial Resource Strain: Low Risk  (7/12/2024)    [facility]    Financial Resource Strain     Difficulty of Paying Living Expenses: 3     Difficulty of Paying Living Expenses: Not on file   Food Insecurity: No Food Insecurity (7/12/2024)    Received from [facility]    Food Insecurity     Do you worry your food will run out before you are able to buy more?: 1   Transportation Needs: No Transportation Needs (7/12/2024)    Received from  [facility]    Transportation Needs     Does lack of transportation keep you from medical appointments?: 1     Does lack of transportation keep you from work, meetings or getting things that you need?: 1   Physical Activity: Sufficiently Active (2/12/2024)    Exercise Vital Sign     Days of Exercise per Week: 5 days     Minutes of Exercise per Session: 60 min   Stress: No Stress Concern Present (2/12/2024)    [employer] - Occupational Stress Questionnaire     Feeling of Stress : Only a little   Social Connections: Socially Integrated (7/12/2024)    Received from [facility]    Social Connections     Do you often feel lonely or isolated from those around you?: 0   Interpersonal Safety: Not on file   Housing Stability: Low Risk  (7/12/2024)    [facility]    Housing Stability     What is your housing situation today?: 1     Family History   Problem Relation Age of Onset    Hyperlipidemia Mother     Hyperlipidemia Father     Thyroid Disease Father     Diabetes Paternal Grandfather     Diabetes Paternal Grandmother          Lab Results   Component Value Date    AST 24 12/31/2024     Lab Results   Component Value Date    ALT 28 12/31/2024     No results found for: \"BILICONJ\"   Lab Results   Component Value Date    BILITOTAL 0.4 12/31/2024     Lab Results   Component Value Date    ALBUMIN 4.5 12/31/2024     Lab Results   Component Value Date    PROTTOTAL 7.4 12/31/2024      Lab Results   Component Value Date    ALKPHOS 54 12/31/2024     Lab Results   Component Value Date    AST 23 01/28/2025     Lab Results   Component Value Date    ALT 28 01/28/2025     No results found for: \"BILICONJ\"   Lab Results   Component Value Date    BILITOTAL 0.3 01/28/2025     Lab Results   Component Value Date    ALBUMIN 4.3 01/28/2025     Lab Results   Component Value Date    PROTTOTAL 7.2 01/28/2025 "      Lab Results   Component Value Date    ALKPHOS 58 01/28/2025                            Subjective findings- 34-year-old returns clinic for onychomycosis and tinea pedis bilaterally.  Relates to taking the oral Lamisil medication with no problems.  Relates to using the econazole cream with no problems.    Objective findings- Vascular status intact bilaterally.  Has dystrophic discolored thickened nails with subungual debris and dystrophy to differing degrees bilaterally with proximal nail growing and clear on the hallux nails bilaterally.  Has decreased dry scaly skin in moccasin pattern bilaterally.    Assessment and plan- Onychomycosis bilaterally.  Tinea Pedis bilaterally.  These are improving.  Diagnosis and treatment options discussed with the patient.  Hepatic panel labs ordered, reviewed and use discussed with the patient.  Hepatic panel labs are within normal limits.  He relates he is okay with us my charting and the results.  Finish Lamisil oral medication.  Continue the Econazole cream.  Return to clinic and see me as needed.            Low to moderate level of medical decision making.        Again, thank you for allowing me to participate in the care of your patient.        Sincerely,    Gary Marcus DPM    Electronically signed

## 2025-03-11 ASSESSMENT — ASTHMA QUESTIONNAIRES
QUESTION_2 LAST FOUR WEEKS HOW OFTEN HAVE YOU HAD SHORTNESS OF BREATH: NOT AT ALL
ACT_TOTALSCORE: 25
QUESTION_4 LAST FOUR WEEKS HOW OFTEN HAVE YOU USED YOUR RESCUE INHALER OR NEBULIZER MEDICATION (SUCH AS ALBUTEROL): NOT AT ALL
QUESTION_3 LAST FOUR WEEKS HOW OFTEN DID YOUR ASTHMA SYMPTOMS (WHEEZING, COUGHING, SHORTNESS OF BREATH, CHEST TIGHTNESS OR PAIN) WAKE YOU UP AT NIGHT OR EARLIER THAN USUAL IN THE MORNING: NOT AT ALL
QUESTION_1 LAST FOUR WEEKS HOW MUCH OF THE TIME DID YOUR ASTHMA KEEP YOU FROM GETTING AS MUCH DONE AT WORK, SCHOOL OR AT HOME: NONE OF THE TIME
QUESTION_5 LAST FOUR WEEKS HOW WOULD YOU RATE YOUR ASTHMA CONTROL: COMPLETELY CONTROLLED

## 2025-03-12 ENCOUNTER — OFFICE VISIT (OUTPATIENT)
Dept: FAMILY MEDICINE | Facility: CLINIC | Age: 35
End: 2025-03-12
Payer: COMMERCIAL

## 2025-03-12 VITALS
HEART RATE: 85 BPM | SYSTOLIC BLOOD PRESSURE: 106 MMHG | OXYGEN SATURATION: 98 % | BODY MASS INDEX: 28.85 KG/M2 | RESPIRATION RATE: 18 BRPM | DIASTOLIC BLOOD PRESSURE: 64 MMHG | HEIGHT: 69 IN | WEIGHT: 194.8 LBS | TEMPERATURE: 97 F

## 2025-03-12 DIAGNOSIS — I25.10 CORONARY ARTERY DISEASE INVOLVING NATIVE HEART WITHOUT ANGINA PECTORIS, UNSPECIFIED VESSEL OR LESION TYPE: ICD-10-CM

## 2025-03-12 DIAGNOSIS — Z00.00 ROUTINE GENERAL MEDICAL EXAMINATION AT A HEALTH CARE FACILITY: Primary | ICD-10-CM

## 2025-03-12 DIAGNOSIS — K21.00 GASTROESOPHAGEAL REFLUX DISEASE WITH ESOPHAGITIS WITHOUT HEMORRHAGE: ICD-10-CM

## 2025-03-12 PROCEDURE — 99213 OFFICE O/P EST LOW 20 MIN: CPT | Mod: 25 | Performed by: FAMILY MEDICINE

## 2025-03-12 PROCEDURE — 1126F AMNT PAIN NOTED NONE PRSNT: CPT | Performed by: FAMILY MEDICINE

## 2025-03-12 PROCEDURE — 99395 PREV VISIT EST AGE 18-39: CPT | Performed by: FAMILY MEDICINE

## 2025-03-12 PROCEDURE — 3078F DIAST BP <80 MM HG: CPT | Performed by: FAMILY MEDICINE

## 2025-03-12 PROCEDURE — 3074F SYST BP LT 130 MM HG: CPT | Performed by: FAMILY MEDICINE

## 2025-03-12 SDOH — HEALTH STABILITY: PHYSICAL HEALTH: ON AVERAGE, HOW MANY DAYS PER WEEK DO YOU ENGAGE IN MODERATE TO STRENUOUS EXERCISE (LIKE A BRISK WALK)?: 6 DAYS

## 2025-03-12 ASSESSMENT — ENCOUNTER SYMPTOMS
ENDOCRINE NEGATIVE: 1
NERVOUS/ANXIOUS: 0
APPETITE CHANGE: 0
HALLUCINATIONS: 0
SEIZURES: 0
WHEEZING: 0
AGITATION: 0
FATIGUE: 0
EYE DISCHARGE: 0
DECREASED CONCENTRATION: 0
ALLERGIC/IMMUNOLOGIC NEGATIVE: 1
COUGH: 0
CONFUSION: 0
HEADACHES: 0
HEMATOLOGIC/LYMPHATIC NEGATIVE: 1
SHORTNESS OF BREATH: 0
COLOR CHANGE: 0
ACTIVITY CHANGE: 0
PALPITATIONS: 0
DIZZINESS: 0
GASTROINTESTINAL NEGATIVE: 1
EYE PAIN: 0
FEVER: 0

## 2025-03-12 ASSESSMENT — SOCIAL DETERMINANTS OF HEALTH (SDOH): HOW OFTEN DO YOU GET TOGETHER WITH FRIENDS OR RELATIVES?: MORE THAN THREE TIMES A WEEK

## 2025-03-12 ASSESSMENT — ASTHMA QUESTIONNAIRES: ACT_TOTALSCORE: 25

## 2025-03-12 ASSESSMENT — PAIN SCALES - GENERAL: PAINLEVEL_OUTOF10: NO PAIN (0)

## 2025-03-12 NOTE — PROGRESS NOTES
"  {PROVIDER CHARTING PREFERENCE:806790}    Amparo Dumont is a 35 year old, presenting for the following health issues:  Follow Up        3/12/2025    12:43 PM   Additional Questions   Roomed by Severiano Andrews CMA   Accompanied by N/A         3/12/2025    12:43 PM   Patient Reported Additional Medications   Patient reports taking the following new medications No new medications     History of Present Illness       Reason for visit:  Annual physical    He eats 2-3 servings of fruits and vegetables daily.He consumes 0 sweetened beverage(s) daily.He exercises with enough effort to increase his heart rate 30 to 60 minutes per day.  He exercises with enough effort to increase his heart rate 6 days per week.   He is taking medications regularly.        {MA/LPN/RN Pre-Provider Visit Orders- hCG/UA/Strep (Optional):481000}  {SUPERLIST (Optional):672196}  {additonal problems for provider to add (Optional):527732}    {ROS Picklists (Optional):154799}      Objective    /64   Pulse 85   Temp 97  F (36.1  C) (Temporal)   Resp 18   Ht 1.743 m (5' 8.62\")   Wt 88.4 kg (194 lb 12.8 oz)   SpO2 98%   BMI 29.08 kg/m    Body mass index is 29.08 kg/m .  Physical Exam   {Exam List (Optional):420916}    {Diagnostic Test Results (Optional):990058}        Signed Electronically by: Wilfrido Baeza DO  {Email feedback regarding this note to [email or web address]   :166912}  "

## 2025-03-12 NOTE — PATIENT INSTRUCTIONS
Trae Dumont,    Thank you for allowing [facility] to manage your care.    Please continue with your current medications.     If you have any questions or concerns, please feel free to call us at [phone].    Sincerely,    Dr. Baeza    Did you know?      You can schedule a video visit for follow-up appointments as well as future appointments for certain conditions.  Please see the below link.     [email or web address]    If you have not already done so,  I encourage you to sign up for [employer] ([email or web address]).  This will allow you to review your results, securely communicate with a provider, and schedule virtual visits as well.        Patient Education   Preventive Care Advice   This is general advice given by our system to help you stay healthy. However, your care team may have specific advice just for you. Please talk to your care team about your preventive care needs.  Nutrition  Eat 5 or more servings of fruits and vegetables each day.  Try wheat bread, brown rice and whole grain pasta (instead of white bread, rice, and pasta).  Get enough calcium and vitamin D. Check the label on foods and aim for 100% of the RDA (recommended daily allowance).  Lifestyle  Exercise at least 150 minutes each week  (30 minutes a day, 5 days a week).  Do muscle strengthening activities 2 days a week. These help control your weight and prevent disease.  No smoking.  Wear sunscreen to prevent skin cancer.  Have a dental exam and cleaning every 6 months.  Yearly exams  See your health care team every year to talk about:  Any changes in your health.  Any medicines your care team has prescribed.  Preventive care, family planning, and ways to prevent chronic diseases.  Shots (vaccines)   HPV shots (up to age 26), if you've never had them before.  Hepatitis B shots (up to age 59), if you've never had them before.  COVID-19 shot: Get this shot when it's due.  Flu shot: Get a  flu shot every year.  Tetanus shot: Get a tetanus shot every 10 years.  Pneumococcal, hepatitis A, and RSV shots: Ask your care team if you need these based on your risk.  Shingles shot (for age 50 and up)  General health tests  Diabetes screening:  Starting at age 35, Get screened for diabetes at least every 3 years.  If you are younger than age 35, ask your care team if you should be screened for diabetes.  Cholesterol test: At age 39, start having a cholesterol test every 5 years, or more often if advised.  Bone density scan (DEXA): At age 50, ask your care team if you should have this scan for osteoporosis (brittle bones).  Hepatitis C: Get tested at least once in your life.  STIs (sexually transmitted infections)  Before age 24: Ask your care team if you should be screened for STIs.  After age 24: Get screened for STIs if you're at risk. You are at risk for STIs (including HIV) if:  You are sexually active with more than one person.  You don't use condoms every time.  You or a partner was diagnosed with a sexually transmitted infection.  If you are at risk for HIV, ask about PrEP medicine to prevent HIV.  Get tested for HIV at least once in your life, whether you are at risk for HIV or not.  Cancer screening tests  Cervical cancer screening: If you have a cervix, begin getting regular cervical cancer screening tests starting at age 21.  Breast cancer scan (mammogram): If you've ever had breasts, begin having regular mammograms starting at age 40. This is a scan to check for breast cancer.  Colon cancer screening: It is important to start screening for colon cancer at age 45.  Have a colonoscopy test every 10 years (or more often if you're at risk) Or, ask your provider about stool tests like a FIT test every year or Cologuard test every 3 years.  To learn more about your testing options, visit:   .  For help making a decision, visit:   https://bit.ly/hj25644.  Prostate cancer screening test: If you have a  prostate, ask your care team if a prostate cancer screening test (PSA) at age 55 is right for you.  Lung cancer screening: If you are a current or former smoker ages 50 to 80, ask your care team if ongoing lung cancer screenings are right for you.  For informational purposes only. Not to replace the advice of your health care provider. Copyright   2023 [facility]. All rights reserved. Clinically reviewed by the [facility] [place] [facility]. [employer] 886750 - REV 01/24.

## 2025-03-12 NOTE — PROGRESS NOTES
"Preventive Care Visit  [facility] CHERI Baeza DO, Family Medicine  Mar 12, 2025      Assessment & Plan     Routine general medical examination at a health care facility      Coronary artery disease involving native heart without angina pectoris, unspecified vessel or lesion type  S/P 5V bypass.  Continue Rapatha, Aspirin, Atorvastatin, Prasugrel, and Metoprolol.  Keep upcoming appointment with cardiology.    Gastroesophageal reflux disease with esophagitis without hemorrhage  Continue with omeprazole.  Scheduled for upcoming endoscopy in April.    Patient has been advised of split billing requirements and indicates understanding: Yes    BMI  Estimated body mass index is 29.08 kg/m  as calculated from the following:    Height as of this encounter: 1.743 m (5' 8.62\").    Weight as of this encounter: 88.4 kg (194 lb 12.8 oz).   Weight management plan: Discussed healthy diet and exercise guidelines    Counseling  Appropriate preventive services were addressed with this patient via screening, questionnaire, or discussion as appropriate for fall prevention, nutrition, physical activity, Tobacco-use cessation, social engagement, weight loss and cognition.  Checklist reviewing preventive services available has been given to the patient.  Reviewed patient's diet, addressing concerns and/or questions.       Work on weight loss  Regular exercise    Amparo Dumont is a 35 year old, presenting for the following:  Follow Up        3/12/2025    12:43 PM   Additional Questions   Roomed by Severiano Andrews CMA   Accompanied by N/A         3/12/2025    12:43 PM   Patient Reported Additional Medications   Patient reports taking the following new medications No new medications          History of Present Illness       Reason for visit:  Annual physical    He eats 2-3 servings of fruits and vegetables daily.He consumes 0 sweetened beverage(s) daily.He exercises with enough effort to increase his heart rate " 30 to 60 minutes per day.  He exercises with enough effort to increase his heart rate 6 days per week.   He is taking medications regularly.    Follow up from heart attack that occurred July, 2024, patient has so far been doing well.     Advance Care Planning  Patient does not have a Health Care Directive: Discussed advance care planning with patient; however, patient declined at this time.      3/12/2025   General Health   How would you rate your overall physical health? Good   Feel stress (tense, anxious, or unable to sleep) Only a little   (!) STRESS CONCERN      3/12/2025   Nutrition   Three or more servings of calcium each day? Yes   Diet: Low salt    Low fat/cholesterol   How many servings of fruit and vegetables per day? (!) 2-3   How many sweetened beverages each day? 0-1       Multiple values from one day are sorted in reverse-chronological order         3/12/2025   Exercise   Days per week of moderate/strenous exercise 6 days         3/12/2025   Social Factors   Frequency of gathering with friends or relatives More than three times a week   Worry food won't last until get money to buy more No   Food not last or not have enough money for food? No   Do you have housing? (Housing is defined as stable permanent housing and does not include staying ouside in a car, in a tent, in an abandoned building, in an overnight shelter, or couch-surfing.) Yes   Are you worried about losing your housing? No   Lack of transportation? No   Unable to get utilities (heat,electricity)? No         3/12/2025   Dental   Dentist two times every year? Yes           2/12/2024   TB Screening   Were you born outside of the [place]? No                 3/12/2025   Substance Use   Alcohol more than 3/day or more than 7/wk Not Applicable   Do you use any other substances recreationally? No     Social History     Tobacco Use    Smoking status: Never     Passive exposure: Never    Smokeless tobacco: Never   Vaping Use    Vaping status: Never Used  "  Substance Use Topics    Alcohol use: Not Currently     Comment: rarely, once a few months    Drug use: Never             3/12/2025   One time HIV Screening   Previous HIV test? I don't know         3/12/2025   STI Screening   New sexual partner(s) since last STI/HIV test? No         3/12/2025   Contraception/Family Planning   Questions about contraception or family planning No        Reviewed and updated as needed this visit by Provider                    Past Medical History:   Diagnosis Date    Uncomplicated asthma 1994     Past Surgical History:   Procedure Laterality Date    HC REMOVAL OF TONSILS,<9 Y/O      Description: Tonsillectomy;  Recorded: 02/12/2013;       Physical exam    2. CAD: History of 5V angioplasty with two MARCE.  Currently on Repatha, atorvastatin, metoprolol, and aspirin.  Scheduled for cardiology in April.  No chest pain or palpations at this time.     3.GERD; Scheduled for endoscopy in April. Patient takes omeprazole daily.     Review of Systems   Constitutional:  Negative for activity change, appetite change, fatigue and fever.   HENT: Negative.     Eyes:  Negative for pain, discharge and visual disturbance.   Respiratory:  Negative for cough, shortness of breath and wheezing.    Cardiovascular:  Negative for chest pain, palpitations and leg swelling.   Gastrointestinal: Negative.    Endocrine: Negative.    Genitourinary: Negative.    Skin:  Negative for color change and rash.   Allergic/Immunologic: Negative.    Neurological:  Negative for dizziness, seizures and headaches.   Hematological: Negative.    Psychiatric/Behavioral:  Negative for agitation, confusion, decreased concentration and hallucinations. The patient is not nervous/anxious.           Objective    Exam  /64   Pulse 85   Temp 97  F (36.1  C) (Temporal)   Resp 18   Ht 1.743 m (5' 8.62\")   Wt 88.4 kg (194 lb 12.8 oz)   SpO2 98%   BMI 29.08 kg/m     Estimated body mass index is 29.08 kg/m  as calculated from the " "following:    Height as of this encounter: 1.743 m (5' 8.62\").    Weight as of this encounter: 88.4 kg (194 lb 12.8 oz).    Physical Exam  Constitutional:       General: He is not in acute distress.     Appearance: He is well-developed.   HENT:      Head: Normocephalic and atraumatic.      Right Ear: External ear normal.      Left Ear: External ear normal.      Nose: Nose normal.      Mouth/Throat:      Pharynx: No oropharyngeal exudate.   Eyes:      General:         Right eye: No discharge.         Left eye: No discharge.      Conjunctiva/sclera: Conjunctivae normal.      Pupils: Pupils are equal, round, and reactive to light.   Neck:      Thyroid: No thyromegaly.      Trachea: No tracheal deviation.   Cardiovascular:      Rate and Rhythm: Normal rate and regular rhythm.      Heart sounds: Normal heart sounds. No murmur heard.  Pulmonary:      Effort: Pulmonary effort is normal. No respiratory distress.      Breath sounds: Normal breath sounds. No wheezing.   Chest:      Chest wall: No tenderness.   Abdominal:      General: There is no distension.      Palpations: Abdomen is soft. There is no mass.      Tenderness: There is no abdominal tenderness. There is no guarding.   Musculoskeletal:         General: Normal range of motion.      Cervical back: Normal range of motion and neck supple.   Lymphadenopathy:      Cervical: No cervical adenopathy.   Skin:     General: Skin is warm.      Findings: No erythema or rash.   Neurological:      Mental Status: He is alert and oriented to person, place, and time.      Cranial Nerves: No cranial nerve deficit.      Coordination: Coordination normal.   Psychiatric:         Behavior: Behavior normal.           Signed Electronically by: Wilfrido Baeza DO    "

## 2025-05-29 ENCOUNTER — VIRTUAL VISIT (OUTPATIENT)
Dept: FAMILY MEDICINE | Facility: CLINIC | Age: 35
End: 2025-05-29
Payer: COMMERCIAL

## 2025-05-29 DIAGNOSIS — Z30.09 FAMILY PLANNING: Primary | ICD-10-CM

## 2025-05-29 DIAGNOSIS — E66.01 MORBID OBESITY (H): ICD-10-CM

## 2025-05-29 ASSESSMENT — ENCOUNTER SYMPTOMS
PALPITATIONS: 0
APPETITE CHANGE: 0
ALLERGIC/IMMUNOLOGIC NEGATIVE: 1
HALLUCINATIONS: 0
ACTIVITY CHANGE: 0
CONFUSION: 0
SEIZURES: 0
ENDOCRINE NEGATIVE: 1
EYE DISCHARGE: 0
HEADACHES: 0
DIZZINESS: 0
AGITATION: 0
EYE PAIN: 0
NERVOUS/ANXIOUS: 0
FATIGUE: 0
SHORTNESS OF BREATH: 0
GASTROINTESTINAL NEGATIVE: 1
HEMATOLOGIC/LYMPHATIC NEGATIVE: 1
FEVER: 0
COUGH: 0
WHEEZING: 0
COLOR CHANGE: 0
DECREASED CONCENTRATION: 0

## 2025-05-29 NOTE — PROGRESS NOTES
Tim is a 35 year old who is being evaluated via a billable video visit.    How would you like to obtain your AVS? MyChart  If the video visit is dropped, the invitation should be resent by: Text to cell phone: [phone]  Will anyone else be joining your video visit? No      Assessment & Plan     Family planning  - Adult Urology  Referral; Future    Morbid obesity (H)  Chronic.  Patient is down to 180 lbs.  Continue with Mounjaro.     Subjective   Tim is a 35 year old, presenting for the following health issues:  LAB REQUEST (Sexual health questions )        5/29/2025     4:18 PM   Additional Questions   Roomed by Desean Nayak CMA   Accompanied by N/A         5/29/2025     4:18 PM   Patient Reported Additional Medications   Patient reports taking the following new medications No new medications.     History of Present Illness       Reason for visit:  Lab request-sexual health questions   He is taking medications regularly.      Patient states he has not come in contact with any STI's or STD's but wants to speak about future planning. Did not specify further about what exactly.     Family planning: Patient's wife actively working to be evaluated by OB/Gyn.  Patient would like to evaluated for his sperm count and morphology.     2. Erectile dysfunction: Unsure if this is stress related. Patient states that he has occasional issues starting and maintaining an erection.     Review of Systems   Constitutional:  Negative for activity change, appetite change, fatigue and fever.   HENT: Negative.     Eyes:  Negative for pain, discharge and visual disturbance.   Respiratory:  Negative for cough, shortness of breath and wheezing.    Cardiovascular:  Negative for chest pain, palpitations and leg swelling.   Gastrointestinal: Negative.    Endocrine: Negative.    Genitourinary: Negative.    Skin:  Negative for color change and rash.   Allergic/Immunologic: Negative.    Neurological:  Negative for dizziness,  seizures and headaches.   Hematological: Negative.    Psychiatric/Behavioral:  Negative for agitation, confusion, decreased concentration and hallucinations. The patient is not nervous/anxious.            Objective       Vitals:  No vitals were obtained today due to virtual visit.    Physical Exam   GENERAL: alert and no distress  EYES: Eyes grossly normal to inspection.  No discharge or erythema, or obvious scleral/conjunctival abnormalities.  RESP: No audible wheeze, cough, or visible cyanosis.    SKIN: Visible skin clear. No significant rash, abnormal pigmentation or lesions.  NEURO: Cranial nerves grossly intact.  Mentation and speech appropriate for age.  PSYCH: Appropriate affect, tone, and pace of words          Video-Visit Details    Type of service:  Video Visit   Originating Location (pt. Location): Home    Distant Location (provider location):  On-site  Platform used for Video Visit: Monique  Signed Electronically by: Wilfrido Baeza DO

## 2025-06-02 ENCOUNTER — PATIENT OUTREACH (OUTPATIENT)
Dept: CARE COORDINATION | Facility: CLINIC | Age: 35
End: 2025-06-02
Payer: COMMERCIAL

## 2025-08-13 ENCOUNTER — PATIENT OUTREACH (OUTPATIENT)
Dept: CARE COORDINATION | Facility: CLINIC | Age: 35
End: 2025-08-13
Payer: COMMERCIAL